# Patient Record
Sex: MALE | Race: WHITE | NOT HISPANIC OR LATINO
[De-identification: names, ages, dates, MRNs, and addresses within clinical notes are randomized per-mention and may not be internally consistent; named-entity substitution may affect disease eponyms.]

---

## 2018-03-09 ENCOUNTER — APPOINTMENT (OUTPATIENT)
Dept: PULMONOLOGY | Facility: CLINIC | Age: 58
End: 2018-03-09
Payer: COMMERCIAL

## 2018-03-09 VITALS
DIASTOLIC BLOOD PRESSURE: 78 MMHG | HEIGHT: 73 IN | HEART RATE: 77 BPM | SYSTOLIC BLOOD PRESSURE: 120 MMHG | OXYGEN SATURATION: 96 % | BODY MASS INDEX: 31.14 KG/M2 | WEIGHT: 235 LBS | RESPIRATION RATE: 17 BRPM

## 2018-03-09 PROCEDURE — 99214 OFFICE O/P EST MOD 30 MIN: CPT | Mod: 25

## 2018-03-09 PROCEDURE — 94010 BREATHING CAPACITY TEST: CPT

## 2018-03-09 PROCEDURE — 71046 X-RAY EXAM CHEST 2 VIEWS: CPT

## 2018-03-09 RX ORDER — MONTELUKAST 10 MG/1
10 TABLET, FILM COATED ORAL
Qty: 1 | Refills: 1 | Status: ACTIVE | COMMUNITY
Start: 2018-03-09 | End: 1900-01-01

## 2018-03-09 RX ORDER — ALBUTEROL SULFATE 90 UG/1
108 (90 BASE) AEROSOL, METERED RESPIRATORY (INHALATION) EVERY 6 HOURS
Qty: 3 | Refills: 1 | Status: ACTIVE | COMMUNITY
Start: 2018-03-09 | End: 1900-01-01

## 2018-03-09 RX ORDER — FLUTICASONE FUROATE, UMECLIDINIUM BROMIDE AND VILANTEROL TRIFENATATE 100; 62.5; 25 UG/1; UG/1; UG/1
100-62.5-25 POWDER RESPIRATORY (INHALATION)
Qty: 3 | Refills: 1 | Status: ACTIVE | COMMUNITY
Start: 2018-03-09 | End: 1900-01-01

## 2018-07-13 ENCOUNTER — NON-APPOINTMENT (OUTPATIENT)
Age: 58
End: 2018-07-13

## 2018-07-13 ENCOUNTER — APPOINTMENT (OUTPATIENT)
Dept: PULMONOLOGY | Facility: CLINIC | Age: 58
End: 2018-07-13
Payer: COMMERCIAL

## 2018-07-13 VITALS
OXYGEN SATURATION: 96 % | BODY MASS INDEX: 31.14 KG/M2 | HEIGHT: 73 IN | DIASTOLIC BLOOD PRESSURE: 80 MMHG | HEART RATE: 70 BPM | SYSTOLIC BLOOD PRESSURE: 126 MMHG | RESPIRATION RATE: 17 BRPM | WEIGHT: 235 LBS

## 2018-07-13 PROCEDURE — 94010 BREATHING CAPACITY TEST: CPT

## 2018-07-13 PROCEDURE — 99214 OFFICE O/P EST MOD 30 MIN: CPT | Mod: 25

## 2018-07-13 RX ORDER — UMECLIDINIUM 62.5 UG/1
62.5 AEROSOL, POWDER ORAL
Qty: 3 | Refills: 1 | Status: ACTIVE | COMMUNITY
Start: 2018-07-13 | End: 1900-01-01

## 2018-07-13 RX ORDER — ALBUTEROL SULFATE 90 UG/1
108 (90 BASE) AEROSOL, METERED RESPIRATORY (INHALATION) EVERY 6 HOURS
Qty: 3 | Refills: 1 | Status: ACTIVE | COMMUNITY
Start: 2018-07-13 | End: 1900-01-01

## 2018-11-16 ENCOUNTER — APPOINTMENT (OUTPATIENT)
Dept: PULMONOLOGY | Facility: CLINIC | Age: 58
End: 2018-11-16

## 2018-11-27 ENCOUNTER — EMERGENCY (EMERGENCY)
Facility: HOSPITAL | Age: 58
LOS: 1 days | Discharge: HOME | End: 2018-11-27
Attending: EMERGENCY MEDICINE
Payer: COMMERCIAL

## 2018-11-27 VITALS
OXYGEN SATURATION: 100 % | SYSTOLIC BLOOD PRESSURE: 144 MMHG | RESPIRATION RATE: 18 BRPM | DIASTOLIC BLOOD PRESSURE: 90 MMHG | TEMPERATURE: 98 F | HEART RATE: 88 BPM

## 2018-11-27 VITALS
WEIGHT: 229.94 LBS | HEART RATE: 79 BPM | RESPIRATION RATE: 16 BRPM | TEMPERATURE: 99 F | HEIGHT: 73 IN | DIASTOLIC BLOOD PRESSURE: 103 MMHG | OXYGEN SATURATION: 98 % | SYSTOLIC BLOOD PRESSURE: 154 MMHG

## 2018-11-27 DIAGNOSIS — Z98.890 OTHER SPECIFIED POSTPROCEDURAL STATES: Chronic | ICD-10-CM

## 2018-11-27 PROCEDURE — 73030 X-RAY EXAM OF SHOULDER: CPT | Mod: 26,RT

## 2018-11-27 PROCEDURE — 73030 X-RAY EXAM OF SHOULDER: CPT | Mod: 26,RT,77

## 2018-11-27 PROCEDURE — 99284 EMERGENCY DEPT VISIT MOD MDM: CPT

## 2018-11-27 PROCEDURE — 73060 X-RAY EXAM OF HUMERUS: CPT | Mod: 26,RT

## 2018-11-27 PROCEDURE — 73060 X-RAY EXAM OF HUMERUS: CPT

## 2018-11-27 PROCEDURE — 99283 EMERGENCY DEPT VISIT LOW MDM: CPT | Mod: 25

## 2018-11-27 PROCEDURE — 73020 X-RAY EXAM OF SHOULDER: CPT

## 2018-11-27 PROCEDURE — 73030 X-RAY EXAM OF SHOULDER: CPT

## 2018-11-27 RX ORDER — LIDOCAINE 4 G/100G
1 CREAM TOPICAL ONCE
Qty: 0 | Refills: 0 | Status: COMPLETED | OUTPATIENT
Start: 2018-11-27 | End: 2018-11-27

## 2018-11-27 RX ORDER — TRAMADOL HYDROCHLORIDE 50 MG/1
50 TABLET ORAL ONCE
Qty: 0 | Refills: 0 | Status: DISCONTINUED | OUTPATIENT
Start: 2018-11-27 | End: 2018-11-27

## 2018-11-27 RX ORDER — MORPHINE SULFATE 50 MG/1
15 CAPSULE, EXTENDED RELEASE ORAL ONCE
Qty: 0 | Refills: 0 | Status: COMPLETED | OUTPATIENT
Start: 2018-11-27 | End: 2018-11-27

## 2018-11-27 RX ORDER — IBUPROFEN 200 MG
600 TABLET ORAL ONCE
Qty: 0 | Refills: 0 | Status: COMPLETED | OUTPATIENT
Start: 2018-11-27 | End: 2018-11-27

## 2018-11-27 RX ORDER — MORPHINE SULFATE 50 MG/1
1 CAPSULE, EXTENDED RELEASE ORAL
Qty: 4 | Refills: 0 | OUTPATIENT
Start: 2018-11-27 | End: 2018-11-28

## 2018-11-27 RX ADMIN — LIDOCAINE 1 PATCH: 4 CREAM TOPICAL at 06:35

## 2018-11-27 RX ADMIN — LIDOCAINE 1 PATCH: 4 CREAM TOPICAL at 07:12

## 2018-11-27 RX ADMIN — TRAMADOL HYDROCHLORIDE 50 MILLIGRAM(S): 50 TABLET ORAL at 07:59

## 2018-11-27 RX ADMIN — Medication 600 MILLIGRAM(S): at 06:34

## 2018-11-27 RX ADMIN — Medication 600 MILLIGRAM(S): at 07:12

## 2018-11-27 RX ADMIN — TRAMADOL HYDROCHLORIDE 50 MILLIGRAM(S): 50 TABLET ORAL at 07:22

## 2018-11-27 NOTE — ED PROVIDER NOTE - NSFOLLOWUPINSTRUCTIONS_ED_ALL_ED_FT
1. You were seen for shoulder pain. A copy of your resulted labs, imaging, and findings have been provided to you.  2.  morphine from your pharmacy and take the medication as prescribed on the bottle's manufacterer's label, and consult a pharmacist or your primary care doctor with any questions. Do not drive while taking morphine.   3. Follow up with an orthopedic surgeon ([facility] call [phone] to make an appointment and your primary care doctor within 48 hours. Please call [phone] to make an appointment or with any questions you may have.  4. Return immediately to the emergency department for new, persistent, or worsening symptoms or signs. Return immediately to the emergency department if you have chest pain, shortness of breath, loss of consciousness, discoloration or tenseness or loss of movement or sensation in your arm.

## 2018-11-27 NOTE — ED PROVIDER NOTE - PROGRESS NOTE DETAILS
Salima BRADY: pt states he still has pain in his R arm, called radiology attg Tamanna BRADY who said there is no fx of R humerus will d/c with morphine rx and ortho and pmd f/u and return precautions Salima BRADY: pt states he still has pain in his R arm, on exam radial pulse 2+ BUE compartments soft skin warm and dry called radiology attg Tamanna BRADY who said there is no fx of R humerus will d/c with morphine rx and ortho and pmd f/u and return precautions

## 2018-11-27 NOTE — ED PROVIDER NOTE - MEDICAL DECISION MAKING DETAILS
58M, p.w right shoulder pain s/p  fall. No gross deformity/dislocation on physical exam, will get xray and lido patch w. pain control. No numbness/tingling, sensation intact, neurovascular intact. FROM is limited due to pain. Likely muscle strain. d/c if xray neg. 58M, p.w right shoulder pain s/p  fall. No gross deformity/dislocation on physical exam, will get xray and lido patch w. pain control. No numbness/tingling, sensation intact, neurovascular intact. FROM is limited due to pain. Likely muscle strain. d/c if xray neg.  Attending Angeline Garcia: 59 y/o male right hand dominant presenting after mechanical fall onto right shoulder. no obvious deformity on exam. no focal neurologic deficit. likely muscular contusion. will obtain xray, pain control and re-eval

## 2018-11-27 NOTE — ED PROVIDER NOTE - OBJECTIVE STATEMENT
58M p.w  fall after spilling on water last night and fell on right shoulder. Pt has sharp pain on the right shoulder and unable to extend his right arm due to pain but denied any numbness/tingling, loc, head injury, headache, lightheadedness, vertigo, chestpain, shortness of breath. 58M p.w  fall after spilling on water last night and fell on right shoulder. Pt has sharp pain on the right shoulder and unable to extend his right arm due to pain but denied any numbness/tingling, loc, head injury, headache, lightheadedness, vertigo, chestpain, shortness of breath.  Attending Angeline Garcia: 59 y/o male right hand dominant presenting with right shoulder pain. pt slipped and fell onto right shoulder. since then increased pain to right arm with difficulty with moving it. no numbness or tingling. not on blood thinners and no h/o bleeding disorders.did not hit his head  and denies any LOC. pain severe with movement.

## 2018-11-27 NOTE — ED PROVIDER NOTE - ATTENDING CONTRIBUTION TO CARE
Attending MD Angeline Garcia:  I personally have seen and examined this patient.  Resident note reviewed and agree on plan of care and except where noted.  See HPI, PE, and MDM for details.

## 2018-11-27 NOTE — ED PROVIDER NOTE - PHYSICAL EXAMINATION
General: NAD, good hygiene, well developed  MSK: tender around acromion-clavicular, no swelling/abrasion, gross deformity or fractures, sensation is intact b/l, FROM of right shoulder is limited due to pain, flexion and abduction to 70 degree w.o pain, unable to extend pass 90. FROM in R elbow and wrist. No tenderness or deformity in right arm. radial pulses equal and b/l   HENT: Atraumatic, PERRLA, no conjunctivae injection  Cardiovascular: RRR, S1&2, no M or R, radial pulses equal and b/l  Respiratory: CTABL, no wheezes or crackles, no decreased breath sounds  Neurologic: nonfocal, AAOx3  Psych: normal mood and affect General: NAD, good hygiene, well developed  MSK: tender around acromion-clavicular, no swelling/abrasion, gross deformity or fractures, sensation is intact b/l, FROM of right shoulder is limited due to pain, flexion and abduction to 70 degree w.o pain, unable to extend pass 90. FROM in R elbow and wrist. No tenderness or deformity in right arm. radial pulses equal and b/l   HENT: Atraumatic, PERRLA, no conjunctivae injection  Cardiovascular: RRR, S1&2, no M or R, radial pulses equal and b/l  Respiratory: CTABL, no wheezes or crackles, no decreased breath sounds  Neurologic: nonfocal, AAOx3  Psych: normal mood and affect  Attending Angeline Garcia: Gen: NAD, heent: atrauamtic, eomi, perrla, mmm, op pink, uvula midline, neck; nttp, no nuchal rigidity, chest: nttp, no crepitus, cv: rrr, no murmurs, lungs: ctab, abd: soft, nontender, nondistended, no peritoneal signs, +BS, no guarding, ext: ttp right shoulder and upper bicep. no deformity. no tenting, compartment soft, skin: no rash, neuro: awake and alert, following commands, speech clear, sensation and strength intact, no focal deficits

## 2018-11-27 NOTE — ED PROVIDER NOTE - CARE PLAN
Assessment and plan of treatment:	Thank you for visiting our Emergency Department, it has been a pleasure taking part in your healthcare.    Your discharge diagnosis is: right shoulder injury    A copy of resulted labs, imaging, and findings have been provided to you.   You have had a detailed discussion with your provider regarding your diagnosis, management and discharge plan. You have been given the opportunity to have your questions answered. At this time you have been deemed stable and fit for discharge.    Return precautions to the Emergency Department include but are not limited to: unrelenting nausea, vomiting, fever, chills, chest pain, shortness of breath, dizziness, abdominal pain, worsening pain, syncope, blood in urine or stool, headache that doesn't resolve, numbness or tingling, loss of sensation, loss of motor function, or any other concerning symptoms. Principal Discharge DX:	Shoulder injury, initial encounter  Assessment and plan of treatment:	Thank you for visiting our Emergency Department, it has been a pleasure taking part in your healthcare.    Your discharge diagnosis is: right shoulder injury    A copy of resulted labs, imaging, and findings have been provided to you.   You have had a detailed discussion with your provider regarding your diagnosis, management and discharge plan. You have been given the opportunity to have your questions answered. At this time you have been deemed stable and fit for discharge.    Return precautions to the Emergency Department include but are not limited to: unrelenting nausea, vomiting, fever, chills, chest pain, shortness of breath, dizziness, abdominal pain, worsening pain, syncope, blood in urine or stool, headache that doesn't resolve, numbness or tingling, loss of sensation, loss of motor function, or any other concerning symptoms.

## 2018-11-27 NOTE — ED ADULT NURSE NOTE - PMH
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

## 2018-11-27 NOTE — ED ADULT NURSE REASSESSMENT NOTE - NS ED NURSE REASSESS COMMENT FT1
Report obtained from JODY Hussein. Patients vital signs are stable, patient reports 10/10 pain upon movement but 0/10 at rest. PO tramadol administered will reassess pain in 30 minutes. Patient appears to be in no acute distress, family member at bedside, side rails up call bell within reach.

## 2018-11-27 NOTE — ED PROVIDER NOTE - NS ED ROS FT
07-Aug-2017 15:37 General: denies fever, chills, fatigue  HENT: denies nasal congestion, sore throat, ear pain, hearing loss  Eyes: denies visual changes  Neck: denies neck pain, swelling, stiffness  CV: denies chest pain, palpitations  Resp: denies difficulty breathing, cough  GI: denies nausea, vomiting, diarrhea, abdominal pain, constipation  Urinary: denies pain on urination change  MSK: HPI   Neuro: denies headaches, lightheadedness  Skin: denies rashes

## 2018-11-27 NOTE — ED ADULT NURSE NOTE - NSIMPLEMENTINTERV_GEN_ALL_ED
Implemented All Fall Risk Interventions:  [place] to call system. Call bell, personal items and telephone within reach. Instruct patient to call for assistance. Room bathroom lighting operational. Non-slip footwear when patient is off stretcher. Physically safe environment: no spills, clutter or unnecessary equipment. Stretcher in lowest position, wheels locked, appropriate side rails in place. Provide visual cue, wrist band, yellow gown, etc. Monitor gait and stability. Monitor for mental status changes and reorient to person, place, and time. Review medications for side effects contributing to fall risk. Reinforce activity limits and safety measures with patient and family.

## 2018-11-27 NOTE — ED PROVIDER NOTE - NSFOLLOWUPCLINICS_GEN_ALL_ED_FT
[facility]  Orthopedic Surgery  [address] [address]  Phone: [phone]  Fax:   Follow Up Time: 1-3 Days

## 2018-11-27 NOTE — ED PROVIDER NOTE - PLAN OF CARE
Thank you for visiting our Emergency Department, it has been a pleasure taking part in your healthcare.    Your discharge diagnosis is: right shoulder injury    A copy of resulted labs, imaging, and findings have been provided to you.   You have had a detailed discussion with your provider regarding your diagnosis, management and discharge plan. You have been given the opportunity to have your questions answered. At this time you have been deemed stable and fit for discharge.    Return precautions to the Emergency Department include but are not limited to: unrelenting nausea, vomiting, fever, chills, chest pain, shortness of breath, dizziness, abdominal pain, worsening pain, syncope, blood in urine or stool, headache that doesn't resolve, numbness or tingling, loss of sensation, loss of motor function, or any other concerning symptoms.

## 2018-11-27 NOTE — ED ADULT NURSE NOTE - OBJECTIVE STATEMENT
Patient presented to ED ambulatory w/wife c/o right shoulder pain s/p mechanical fall. Patient woke up at about 430 am because the fire alarm was ringing, there was a leak on the ceiling and water was on the floor, patient sipped on the wet floor and fell on his right arm. Patient c/o right shoulder /upper arm pain w/ movement, when at rest (not movement) no pain. No further complaints.

## 2019-01-02 ENCOUNTER — OUTPATIENT (OUTPATIENT)
Dept: OUTPATIENT SERVICES | Facility: HOSPITAL | Age: 59
LOS: 1 days | End: 2019-01-02
Payer: COMMERCIAL

## 2019-01-02 VITALS
DIASTOLIC BLOOD PRESSURE: 68 MMHG | SYSTOLIC BLOOD PRESSURE: 132 MMHG | HEART RATE: 83 BPM | TEMPERATURE: 98 F | RESPIRATION RATE: 16 BRPM | OXYGEN SATURATION: 97 % | WEIGHT: 229.94 LBS | HEIGHT: 72 IN

## 2019-01-02 DIAGNOSIS — J45.909 UNSPECIFIED ASTHMA, UNCOMPLICATED: ICD-10-CM

## 2019-01-02 DIAGNOSIS — M75.121 COMPLETE ROTATOR CUFF TEAR OR RUPTURE OF RIGHT SHOULDER, NOT SPECIFIED AS TRAUMATIC: ICD-10-CM

## 2019-01-02 DIAGNOSIS — Z98.890 OTHER SPECIFIED POSTPROCEDURAL STATES: Chronic | ICD-10-CM

## 2019-01-02 DIAGNOSIS — G47.33 OBSTRUCTIVE SLEEP APNEA (ADULT) (PEDIATRIC): ICD-10-CM

## 2019-01-02 DIAGNOSIS — M25.511 PAIN IN RIGHT SHOULDER: ICD-10-CM

## 2019-01-02 DIAGNOSIS — Z01.818 ENCOUNTER FOR OTHER PREPROCEDURAL EXAMINATION: ICD-10-CM

## 2019-01-02 LAB
BUN SERPL-MCNC: 16 MG/DL — SIGNIFICANT CHANGE UP (ref 7–23)
CALCIUM SERPL-MCNC: 10.2 MG/DL — SIGNIFICANT CHANGE UP (ref 8.4–10.5)
CHLORIDE SERPL-SCNC: 102 MMOL/L — SIGNIFICANT CHANGE UP (ref 98–107)
CO2 SERPL-SCNC: 27 MMOL/L — SIGNIFICANT CHANGE UP (ref 22–31)
CREAT SERPL-MCNC: 1.18 MG/DL — SIGNIFICANT CHANGE UP (ref 0.5–1.3)
GLUCOSE SERPL-MCNC: 128 MG/DL — HIGH (ref 70–99)
HCT VFR BLD CALC: 48.6 % — SIGNIFICANT CHANGE UP (ref 39–50)
HGB BLD-MCNC: 16.4 G/DL — SIGNIFICANT CHANGE UP (ref 13–17)
MCHC RBC-ENTMCNC: 29.5 PG — SIGNIFICANT CHANGE UP (ref 27–34)
MCHC RBC-ENTMCNC: 33.7 % — SIGNIFICANT CHANGE UP (ref 32–36)
MCV RBC AUTO: 87.4 FL — SIGNIFICANT CHANGE UP (ref 80–100)
NRBC # FLD: 0 — SIGNIFICANT CHANGE UP
PLATELET # BLD AUTO: 192 K/UL — SIGNIFICANT CHANGE UP (ref 150–400)
PMV BLD: 11.2 FL — SIGNIFICANT CHANGE UP (ref 7–13)
POTASSIUM SERPL-MCNC: 4.6 MMOL/L — SIGNIFICANT CHANGE UP (ref 3.5–5.3)
POTASSIUM SERPL-SCNC: 4.6 MMOL/L — SIGNIFICANT CHANGE UP (ref 3.5–5.3)
RBC # BLD: 5.56 M/UL — SIGNIFICANT CHANGE UP (ref 4.2–5.8)
RBC # FLD: 12.5 % — SIGNIFICANT CHANGE UP (ref 10.3–14.5)
SODIUM SERPL-SCNC: 140 MMOL/L — SIGNIFICANT CHANGE UP (ref 135–145)
WBC # BLD: 6.23 K/UL — SIGNIFICANT CHANGE UP (ref 3.8–10.5)
WBC # FLD AUTO: 6.23 K/UL — SIGNIFICANT CHANGE UP (ref 3.8–10.5)

## 2019-01-02 PROCEDURE — 93010 ELECTROCARDIOGRAM REPORT: CPT

## 2019-01-02 RX ORDER — SODIUM CHLORIDE 9 MG/ML
1000 INJECTION, SOLUTION INTRAVENOUS
Qty: 0 | Refills: 0 | Status: DISCONTINUED | OUTPATIENT
Start: 2019-01-02 | End: 2019-01-17

## 2019-01-02 RX ORDER — BUDESONIDE AND FORMOTEROL FUMARATE DIHYDRATE 160; 4.5 UG/1; UG/1
2 AEROSOL RESPIRATORY (INHALATION)
Qty: 0 | Refills: 0 | COMMUNITY

## 2019-01-02 NOTE — H&P PST ADULT - LYMPHATIC
posterior cervical R/anterior cervical R/posterior cervical L/supraclavicular R/anterior cervical L/supraclavicular L

## 2019-01-02 NOTE — H&P PST ADULT - PSH
History of lung biopsy  Right Thoracoscopy 2004  S/P bilateral foot surgery  11/16  ; Right Foot Plantar fascitis ; left foot bone spur History of lung biopsy  Right Thoracoscopy 2004  S/P bilateral foot surgery  11/16  ; Right Foot Plantar Fascitis ; left foot bone spur

## 2019-01-02 NOTE — H&P PST ADULT - HISTORY OF PRESENT ILLNESS
Pt is a 58 y.o. male ; pt sustained injury to right shoulder ; pt to ER @ [facility] ; xrays were done ; pt to surgeon ; pt now Pt is a 58 y.o. male ;12/18 ; pt sustained fall ; pt reports  right shoulder pain  ; pt to ER @ [facility]  ; xrays were done ; pt referred to surgeon ; pt now presents for Right Shoulder Arthroscopy, Rotator Cuff Repair

## 2019-01-02 NOTE — H&P PST ADULT - PROBLEM SELECTOR PLAN 1
Right Shoulder Arthroscopy Rotator Cuff Repair     Pre op instructions reviewed with pt ; including Pepcid and Hibiclens ; pt appears to have a good understanding of pre op instructions

## 2019-01-02 NOTE — H&P PST ADULT - FAMILY HISTORY
Father  Still living? Yes, Estimated age: Age Unknown  Family history of prostate cancer in father, Age at diagnosis: Age Unknown     Mother  Still living? Yes, Estimated age: Age Unknown  Family history of memory loss, Age at diagnosis: Age Unknown

## 2019-01-02 NOTE — H&P PST ADULT - PMH
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

## 2019-01-04 PROBLEM — K21.9 GASTRO-ESOPHAGEAL REFLUX DISEASE WITHOUT ESOPHAGITIS: Chronic | Status: ACTIVE | Noted: 2019-01-02

## 2019-01-08 ENCOUNTER — APPOINTMENT (OUTPATIENT)
Dept: PULMONOLOGY | Facility: CLINIC | Age: 59
End: 2019-01-08
Payer: COMMERCIAL

## 2019-01-08 ENCOUNTER — NON-APPOINTMENT (OUTPATIENT)
Age: 59
End: 2019-01-08

## 2019-01-08 VITALS
DIASTOLIC BLOOD PRESSURE: 88 MMHG | SYSTOLIC BLOOD PRESSURE: 138 MMHG | BODY MASS INDEX: 31.14 KG/M2 | HEART RATE: 85 BPM | WEIGHT: 235 LBS | HEIGHT: 73 IN | RESPIRATION RATE: 17 BRPM | OXYGEN SATURATION: 98 %

## 2019-01-08 PROCEDURE — 94010 BREATHING CAPACITY TEST: CPT

## 2019-01-08 PROCEDURE — 99214 OFFICE O/P EST MOD 30 MIN: CPT | Mod: 25

## 2019-01-08 PROCEDURE — 94618 PULMONARY STRESS TESTING: CPT

## 2019-01-08 RX ORDER — BUDESONIDE AND FORMOTEROL FUMARATE DIHYDRATE 160; 4.5 UG/1; UG/1
160-4.5 AEROSOL RESPIRATORY (INHALATION) TWICE DAILY
Qty: 3 | Refills: 1 | Status: ACTIVE | COMMUNITY
Start: 2019-01-08 | End: 1900-01-01

## 2019-01-08 RX ORDER — ACLIDINIUM BROMIDE 400 UG/1
400 POWDER, METERED RESPIRATORY (INHALATION) TWICE DAILY
Qty: 3 | Refills: 1 | Status: ACTIVE | COMMUNITY
Start: 2019-01-08 | End: 1900-01-01

## 2019-01-08 NOTE — ASSESSMENT
[FreeTextEntry1] : Mr. Keen has a history of asthma, allergy, GERD, and OSAS, presenting to the office for pulmonary pre-op clearance for shoulder surgery. He is currently stable from a pulmonary perspective. \par \par ************************************Pulmonary Pre-Op Clearance for Shoulder Surgery**********************************\par -at this point in time there are no absolute pulmonary contraindications to go forward with the planned procedure \par -at the time of surgery s/he should have optimal pain control, incentive spirometry, early ambulation, DVT and GI prophylaxis. \par \par His shortness of breath is multifactorial due to: \par -overweight/out of shape\par -poor breathing mechanics\par -asthma\par -?CAD\par \par problem 1: mild persistent asthma \par -continue Symbicort 160 at 2 inhalations BID\par - Continue Incruse 1 inhalation Qd\par -continue Singulair 10 mg before bed \par -Continue Ventolin as a rescue inhaler 2 inhalations pre-exercise\par -Asthma is  believed to be caused by inherited (genetic) and environmental factor, but its exact cause is unknown. Asthma may be triggered by allergens, lung infections, or irritants in the air. Asthma triggers are different for each person\par -Inhaler technique reviewed as well as oral hygiene techniques reviewed with patient. Avoidance of cold air, extremes of temperature, rescue inhaler should be used before exercise. Order of medication reviewed with patient. Recommended use of a cool mist humidifier in the bedroom. Instructed to gargle and spit after inhaler use. \par \par problem 2: allergies/sinus\par -continue to use Astelin 0.15 1 sniff each nostril BID\par - Continue Clarinex 5 mg QHS\par -Environmental measures for allergies were encouraged including mattress and pillow cover, air purifier, and environmental controls. \par \par problem 3: GERD\par -add Zantac 300 mg QHS\par -recommended to increase hydration\par -Things to avoid including overeating, spicy foods, tight clothing, eating within three hours of bed, this list is not all inclusive. \par -For treatment of reflux, possible options discussed including diet control, H2 blockers, PPIs, as well as coating motility agents discussed as treatment options. Timing of meals and proximity of last meal to sleep were discussed. If symptoms persist, a formal gastrointestinal evaluation is needed.\par -Rule of 2's: Avoid eating too late, too fast, too much, too spicy or within two hours of bedtime \par \par problem 4: moderate sleep apnea\par -his sleep study showed an AH of 24.4\par -being recommended to see Bambi Fan for an oral appliance (NONCOMPLIANT)\par -Sleep apnea is associated with adverse clinical consequences which an affect most organ systems.  Cardiovascular disease risk includes arrhythmias, atrial fibrillation, hypertension, coronary artery disease, and stroke. Metabolic disorders include diabetes type 2, non-alcoholic fatty liver disease. Mood disorder especially depression; and cognitive decline especially in the elderly. Associations with  chronic reflux/Meyers’s esophagus some but not all inclusive. \par -Reasons  include arousal consistent with hypopnea; respiratory events most prominent in REM sleep or supine position; therefore sleep staging and body position are important for accurate diagnosis and estimation of AHI. \par \par problem 5: overweight/out of shape\par -Weight loss, exercise, and diet control were discussed and are highly encouraged. Treatment options were \par given such as, aqua therapy, and contacting a nutritionist.\par \par problem 6: poor breathing mechanics\par -Proper breathing techniques were reviewed with an emphasis of exhalation. Patient instructed to breath in for 1 second and out for four seconds. Patient was encouraged to not talk while walking.\par \par problem 7: ?CAD\par -recommended to follow up with a cardiologist\par \par \par F/U in 4 months\par He is encouraged to call with any changes, concerns, or questions

## 2019-01-08 NOTE — ADDENDUM
[FreeTextEntry1] : Documented by González Bennett acting as a scribe for Dr. Garrett Wood on 1/8/2019\par \par All medical record entries made by the Scribe were at my, Dr. Garrett Wood's, direction and personally dictated by me on 1/8/2019. I have reviewed the chart and agree that the record accurately reflects my personal performance of the history, physical exam, assessment and plan. I have also personally directed, reviewed, and agree with the discharge instructions. \par \par \par \par \par

## 2019-01-08 NOTE — PROCEDURE
[FreeTextEntry1] : Refused Niox\par \par PFT- spi reveals normal flows; FEV1 was 3.43L which is 83% of predicted; normal flow volume loop \par \par Patient completed a 6-minute walk/exercise study in which the Lowest Pulse Ox was 96%; there was evidence of severe dyspnea and very severe fatigue. The patient walked  34 laps or 554.2 meters.

## 2019-01-08 NOTE — REASON FOR VISIT
[Spouse] : spouse [Preoperative Evaluation] : an preoperative evaluation [FreeTextEntry1] : Shoulder surgery

## 2019-01-13 ENCOUNTER — TRANSCRIPTION ENCOUNTER (OUTPATIENT)
Age: 59
End: 2019-01-13

## 2019-01-13 NOTE — ASU DISCHARGE PLAN (ADULT/PEDIATRIC). - NOTIFY
Fever greater than 101/Numbness, color, or temperature change to extremity/Unable to Urinate/Swelling that continues/Bleeding that does not stop/Persistent Nausea and Vomiting/Pain not relieved by Medications

## 2019-01-13 NOTE — ASU DISCHARGE PLAN (ADULT/PEDIATRIC). - BATHING
shower only shower only/May remove dressing on Wednesday evening and shower. Wash incision with soap and water. Apply band aids.

## 2019-01-13 NOTE — ASU DISCHARGE PLAN (ADULT/PEDIATRIC). - MEDICATION SUMMARY - MEDICATIONS TO TAKE
I will START or STAY ON the medications listed below when I get home from the hospital:    Ventolin  -- 1-2 puffs prn  -- Indication: For Home med    Percocet 5/325 oral tablet  -- 1-2 tab(s) by mouth every 4-6 hours, As Needed -for severe pain MDD:8  -- Caution federal law prohibits the transfer of this drug to any person other  than the person for whom it was prescribed.  May cause drowsiness.  Alcohol may intensify this effect.  Use care when operating dangerous machinery.  This prescription cannot be refilled.  This product contains acetaminophen.  Do not use  with any other product containing acetaminophen to prevent possible liver damage.  Using more of this medication than prescribed may cause serious breathing problems.    -- Indication: For Pain    Symbicort 160 mcg-4.5 mcg/inh inhalation aerosol  -- 2 puff(s) inhaled 1-2 x  a day  -- Indication: For Home med

## 2019-01-13 NOTE — ASU DISCHARGE PLAN (ADULT/PEDIATRIC). - SPECIAL INSTRUCTIONS
see printed instructions see printed instructions    Keep arm in sling except when performing exercises.

## 2019-01-14 ENCOUNTER — OUTPATIENT (OUTPATIENT)
Dept: OUTPATIENT SERVICES | Facility: HOSPITAL | Age: 59
LOS: 1 days | Discharge: HOME | End: 2019-01-14

## 2019-01-14 VITALS
RESPIRATION RATE: 14 BRPM | DIASTOLIC BLOOD PRESSURE: 80 MMHG | OXYGEN SATURATION: 99 % | TEMPERATURE: 98 F | SYSTOLIC BLOOD PRESSURE: 148 MMHG | HEART RATE: 75 BPM

## 2019-01-14 VITALS
HEART RATE: 63 BPM | TEMPERATURE: 97 F | OXYGEN SATURATION: 99 % | DIASTOLIC BLOOD PRESSURE: 89 MMHG | HEIGHT: 72 IN | SYSTOLIC BLOOD PRESSURE: 140 MMHG | WEIGHT: 229.94 LBS | RESPIRATION RATE: 16 BRPM

## 2019-01-14 DIAGNOSIS — Z98.890 OTHER SPECIFIED POSTPROCEDURAL STATES: Chronic | ICD-10-CM

## 2019-01-14 DIAGNOSIS — M75.121 COMPLETE ROTATOR CUFF TEAR OR RUPTURE OF RIGHT SHOULDER, NOT SPECIFIED AS TRAUMATIC: ICD-10-CM

## 2019-01-14 NOTE — BRIEF OPERATIVE NOTE - POST-OP DX
Rotator cuff tear, right  01/14/2019    Active  Avel Ceballos  Subacromial bursitis of right shoulder joint  01/14/2019    Active  Avel Ceballos

## 2019-01-14 NOTE — BRIEF OPERATIVE NOTE - PROCEDURE
<<-----Click on this checkbox to enter Procedure Subacromial decompression  01/14/2019    Active  STUART  Rotator cuff repair  01/14/2019    Active  STUART

## 2019-04-09 ENCOUNTER — NON-APPOINTMENT (OUTPATIENT)
Age: 59
End: 2019-04-09

## 2019-04-09 ENCOUNTER — APPOINTMENT (OUTPATIENT)
Dept: PULMONOLOGY | Facility: CLINIC | Age: 59
End: 2019-04-09
Payer: COMMERCIAL

## 2019-04-09 VITALS
BODY MASS INDEX: 32.91 KG/M2 | OXYGEN SATURATION: 96 % | HEIGHT: 72 IN | SYSTOLIC BLOOD PRESSURE: 130 MMHG | RESPIRATION RATE: 16 BRPM | DIASTOLIC BLOOD PRESSURE: 80 MMHG | HEART RATE: 96 BPM | WEIGHT: 243 LBS

## 2019-04-09 DIAGNOSIS — Z57.9 OCCUPATIONAL EXPOSURE TO UNSPECIFIED RISK FACTOR: ICD-10-CM

## 2019-04-09 PROCEDURE — 99214 OFFICE O/P EST MOD 30 MIN: CPT | Mod: 25

## 2019-04-09 PROCEDURE — 94010 BREATHING CAPACITY TEST: CPT

## 2019-04-09 SDOH — HEALTH STABILITY - PHYSICAL HEALTH: OCCUPATIONAL EXPOSURE TO UNSPECIFIED RISK FACTOR: Z57.9

## 2019-04-09 NOTE — ADDENDUM
[FreeTextEntry1] : Documented by González Bennett acting as a scribe for Dr. Garrett Wood on 4/9/2019\par \par All medical record entries made by the Scribe were at my, Dr. Garrett Wood's, direction and personally dictated by me on 4/9/2019. I have reviewed the chart and agree that the record accurately reflects my personal performance of the history, physical exam, assessment and plan. I have also personally directed, reviewed, and agree with the discharge instructions. \par \par \par \par \par

## 2019-04-09 NOTE — PROCEDURE
[FreeTextEntry1] : PFT- spi reveals mild restrictive dysfunction; FEV1 was 2.99L which is 75% of predicted; normal flow volume loop

## 2019-04-09 NOTE — ASSESSMENT
[FreeTextEntry1] : Mr. Keen has a history of asthma, allergy, GERD, OSAS, and extensive occupational exposure in the workplace (s/p 9/11), presenting to the office for a follow up visit s/p shoulder surgery (Dr. Sosa). He is symptomatic of OSAS and allergies. \par \par His shortness of breath is multifactorial due to: \par -overweight/out of shape\par -poor breathing mechanics\par -asthma\par -?CAD\par \par problem 1: mild persistent asthma \par -continue Symbicort 160 at 2 inhalations BID\par - Continue Incruse 1 inhalation Qd\par -continue Singulair 10 mg before bed \par -Continue Ventolin as a rescue inhaler 2 inhalations pre-exercise\par -Asthma is  believed to be caused by inherited (genetic) and environmental factor, but its exact cause is unknown. Asthma may be triggered by allergens, lung infections, or irritants in the air. Asthma triggers are different for each person\par -Inhaler technique reviewed as well as oral hygiene techniques reviewed with patient. Avoidance of cold air, extremes of temperature, rescue inhaler should be used before exercise. Order of medication reviewed with patient. Recommended use of a cool mist humidifier in the bedroom. Instructed to gargle and spit after inhaler use. \par \par problem 2: allergies/sinus\par -continue to use Astelin 0.15 1 sniff each nostril BID\par - Continue Clarinex 5 mg QHS\par -Environmental measures for allergies were encouraged including mattress and pillow cover, air purifier, and environmental controls. \par \par problem 3: GERD\par - Continue Zantac 300 mg QHS\par -recommended to increase hydration\par -Things to avoid including overeating, spicy foods, tight clothing, eating within three hours of bed, this list is not all inclusive. \par -For treatment of reflux, possible options discussed including diet control, H2 blockers, PPIs, as well as coating motility agents discussed as treatment options. Timing of meals and proximity of last meal to sleep were discussed. If symptoms persist, a formal gastrointestinal evaluation is needed.\par -Rule of 2's: Avoid eating too late, too fast, too much, too spicy or within two hours of bedtime \par \par problem 4: moderate sleep apnea\par -his sleep study showed an AH of 24.4\par - Recommended Oxy Aid\par -being recommended to see Bambi Fan for an oral appliance (NONCOMPLIANT)\par -Sleep apnea is associated with adverse clinical consequences which an affect most organ systems.  Cardiovascular disease risk includes arrhythmias, atrial fibrillation, hypertension, coronary artery disease, and stroke. Metabolic disorders include diabetes type 2, non-alcoholic fatty liver disease. Mood disorder especially depression; and cognitive decline especially in the elderly. Associations with  chronic reflux/Meyers’s esophagus some but not all inclusive. \par -Reasons  include arousal consistent with hypopnea; respiratory events most prominent in REM sleep or supine position; therefore sleep staging and body position are important for accurate diagnosis and estimation of AHI. \par \par problem 5: overweight/out of shape\par -Weight loss, exercise, and diet control were discussed and are highly encouraged. Treatment options were \par given such as, aqua therapy, and contacting a nutritionist.\par \par problem 6: poor breathing mechanics\par -Proper breathing techniques were reviewed with an emphasis of exhalation. Patient instructed to breath in for 1 second and out for four seconds. Patient was encouraged to not talk while walking.\par \par problem 7: ?CAD\par -recommended to follow up with a cardiologist\par \par Problem 8: Abnormal CT \par - Script given for follow up CT scan of the chest 4/2019\par \par Problem 9: Health maintenance \par -s/p flu shot 2018\par -recommended strep pneumonia vaccines: Prevnar-13 vaccine, followed by Pneumo vaccine 23 one year following\par -recommended early intervention for URIs\par -recommended regular osteoporosis evaluations\par -recommended early dermatological evaluations\par -recommended after the age of 50 to the age of 70, colonoscopy every 5 years\par \par F/U in 4 months\par He is encouraged to call with any changes, concerns, or questions

## 2019-04-09 NOTE — REASON FOR VISIT
[Follow-Up] : a follow-up visit [Spouse] : spouse [FreeTextEntry1] : Abnormal CT, acid reflux disease, asthma, SUZY

## 2019-04-09 NOTE — PHYSICAL EXAM
[General Appearance - Well Developed] : well developed [Normal Appearance] : normal appearance [Well Groomed] : well groomed [General Appearance - In No Acute Distress] : no acute distress [No Deformities] : no deformities [General Appearance - Well Nourished] : well nourished [Normal Conjunctiva] : the conjunctiva exhibited no abnormalities [Eyelids - No Xanthelasma] : the eyelids demonstrated no xanthelasmas [Normal Oropharynx] : normal oropharynx [Neck Cervical Mass (___cm)] : no neck mass was observed [Jugular Venous Distention Increased] : there was no jugular-venous distention [Neck Appearance] : the appearance of the neck was normal [Heart Rate And Rhythm] : heart rate and rhythm were normal [Thyroid Diffuse Enlargement] : the thyroid was not enlarged [Thyroid Nodule] : there were no palpable thyroid nodules [Murmurs] : no murmurs present [Heart Sounds] : normal S1 and S2 [Respiration, Rhythm And Depth] : normal respiratory rhythm and effort [Exaggerated Use Of Accessory Muscles For Inspiration] : no accessory muscle use [Auscultation Breath Sounds / Voice Sounds] : lungs were clear to auscultation bilaterally [Abdomen Soft] : soft [Abdomen Tenderness] : non-tender [Abdomen Mass (___ Cm)] : no abdominal mass palpated [Abnormal Walk] : normal gait [Gait - Sufficient For Exercise Testing] : the gait was sufficient for exercise testing [Petechial Hemorrhages (___cm)] : no petechial hemorrhages [Nail Clubbing] : no clubbing of the fingernails [Cyanosis, Localized] : no localized cyanosis [Skin Color & Pigmentation] : normal skin color and pigmentation [] : no rash [No Venous Stasis] : no venous stasis [Skin Lesions] : no skin lesions [No Skin Ulcers] : no skin ulcer [Deep Tendon Reflexes (DTR)] : deep tendon reflexes were 2+ and symmetric [No Xanthoma] : no  xanthoma was observed [No Focal Deficits] : no focal deficits [Oriented To Time, Place, And Person] : oriented to person, place, and time [Sensation] : the sensory exam was normal to light touch and pinprick [Affect] : the affect was normal [Impaired Insight] : insight and judgment were intact [III] : III [FreeTextEntry1] : I:E 1:3, clear

## 2019-04-09 NOTE — HISTORY OF PRESENT ILLNESS
[FreeTextEntry1] : Mr. Keen is a 58 year old male presenting to the office for a follow up visit for abnormal CT, acid reflux disease, asthma, SUZY. His chief complaint is apneic episodes. \par - He comes in following his shoulder surgery by Dr. Sosa. He is currently in PT. \par - For the past few months, he has been waking up in the middle of the night (1-2x) gasping for air. He notes that it occurred before the surgery, but has worsened since his surgery. It takes 1-2 minutes for him to catch his breath and fall back asleep\par - He is waking up more fatigued than he feels he should\par - He is taking naps during the day \par - He is snoring. \par - He is not wheezing\par - He reports a nasal drip and is blowing his nose around 10x/day.\par - He states that he is beginning to develop a cold  \par - He denies any headaches, nausea, vomiting, fever, chills, sweats, chest pain, chest pressure, palpitations, coughing, wheezing, diarrhea, constipation, dysphagia, dizziness, leg swelling, leg pain, itchy eyes, itchy ears, heartburn, reflux, or sour taste in the mouth.

## 2019-06-19 ENCOUNTER — FORM ENCOUNTER (OUTPATIENT)
Age: 59
End: 2019-06-19

## 2019-06-20 ENCOUNTER — APPOINTMENT (OUTPATIENT)
Dept: CT IMAGING | Facility: CLINIC | Age: 59
End: 2019-06-20
Payer: COMMERCIAL

## 2019-06-20 ENCOUNTER — OUTPATIENT (OUTPATIENT)
Dept: OUTPATIENT SERVICES | Facility: HOSPITAL | Age: 59
LOS: 1 days | End: 2019-06-20
Payer: COMMERCIAL

## 2019-06-20 DIAGNOSIS — Z98.890 OTHER SPECIFIED POSTPROCEDURAL STATES: Chronic | ICD-10-CM

## 2019-06-20 DIAGNOSIS — Z00.8 ENCOUNTER FOR OTHER GENERAL EXAMINATION: ICD-10-CM

## 2019-06-20 PROCEDURE — 71250 CT THORAX DX C-: CPT

## 2019-06-20 PROCEDURE — 71250 CT THORAX DX C-: CPT | Mod: 26

## 2019-08-13 ENCOUNTER — NON-APPOINTMENT (OUTPATIENT)
Age: 59
End: 2019-08-13

## 2019-08-13 ENCOUNTER — APPOINTMENT (OUTPATIENT)
Dept: PULMONOLOGY | Facility: CLINIC | Age: 59
End: 2019-08-13
Payer: COMMERCIAL

## 2019-08-13 VITALS
DIASTOLIC BLOOD PRESSURE: 80 MMHG | OXYGEN SATURATION: 97 % | BODY MASS INDEX: 31.15 KG/M2 | WEIGHT: 230 LBS | SYSTOLIC BLOOD PRESSURE: 124 MMHG | HEART RATE: 76 BPM | HEIGHT: 72 IN | RESPIRATION RATE: 16 BRPM

## 2019-08-13 PROCEDURE — 99214 OFFICE O/P EST MOD 30 MIN: CPT | Mod: 25

## 2019-08-13 PROCEDURE — 94010 BREATHING CAPACITY TEST: CPT

## 2019-08-13 NOTE — PHYSICAL EXAM
[Normal Appearance] : normal appearance [General Appearance - Well Developed] : well developed [Well Groomed] : well groomed [No Deformities] : no deformities [General Appearance - Well Nourished] : well nourished [Normal Conjunctiva] : the conjunctiva exhibited no abnormalities [General Appearance - In No Acute Distress] : no acute distress [Eyelids - No Xanthelasma] : the eyelids demonstrated no xanthelasmas [Normal Oropharynx] : normal oropharynx [III] : III [Neck Appearance] : the appearance of the neck was normal [Neck Cervical Mass (___cm)] : no neck mass was observed [Jugular Venous Distention Increased] : there was no jugular-venous distention [Thyroid Diffuse Enlargement] : the thyroid was not enlarged [Heart Rate And Rhythm] : heart rate and rhythm were normal [Thyroid Nodule] : there were no palpable thyroid nodules [Murmurs] : no murmurs present [Heart Sounds] : normal S1 and S2 [Respiration, Rhythm And Depth] : normal respiratory rhythm and effort [Auscultation Breath Sounds / Voice Sounds] : lungs were clear to auscultation bilaterally [Exaggerated Use Of Accessory Muscles For Inspiration] : no accessory muscle use [Abdomen Soft] : soft [Abdomen Tenderness] : non-tender [Abdomen Mass (___ Cm)] : no abdominal mass palpated [Abnormal Walk] : normal gait [Gait - Sufficient For Exercise Testing] : the gait was sufficient for exercise testing [Nail Clubbing] : no clubbing of the fingernails [Petechial Hemorrhages (___cm)] : no petechial hemorrhages [Cyanosis, Localized] : no localized cyanosis [Skin Color & Pigmentation] : normal skin color and pigmentation [] : no rash [No Venous Stasis] : no venous stasis [Skin Lesions] : no skin lesions [No Skin Ulcers] : no skin ulcer [No Xanthoma] : no  xanthoma was observed [Sensation] : the sensory exam was normal to light touch and pinprick [Deep Tendon Reflexes (DTR)] : deep tendon reflexes were 2+ and symmetric [No Focal Deficits] : no focal deficits [Oriented To Time, Place, And Person] : oriented to person, place, and time [Impaired Insight] : insight and judgment were intact [Affect] : the affect was normal [FreeTextEntry1] : I:E 1:3, clear

## 2019-08-13 NOTE — REVIEW OF SYSTEMS
[Negative] : Pulmonary Hypertension [Postnasal Drip] : postnasal drip [Cough] : cough [Dyspnea] : dyspnea [As Noted in HPI] : as noted in HPI [Snoring] : snoring

## 2019-08-13 NOTE — HISTORY OF PRESENT ILLNESS
[FreeTextEntry1] : Mr. Keen is a 58 year old male presenting to the office for a follow up visit for abnormal CT, acid reflux disease, asthma, SUZY. His chief complaint is poison ivy rash.\par -he reports having a poison ivy rash onset 2 weeks ago all over his chest and abdomen\par -he reports feeling generally well\par -he notes he isn't sleeping well and his wife adds he is snoring\par -he reports getting reflux occasionally, depending on what he eats\par -he reports he is coughing dryly due to the heat and humidity.  He reports he was using his inhalers, but still has SOB\par -he reports having a post nasal drip still from his allergies\par -he notes his breathing has improved \par -he reports he hasn’t been exercising regularly\par -he denies any chest pain, chest pressure, diarrhea, constipation, dysphagia, dizziness, sour taste in the mouth, itchy eyes, itchy ears

## 2019-08-13 NOTE — PROCEDURE
[FreeTextEntry1] : PFT revealed normal flows, with a FEV1 of 3.59L, which is 94% of predicted, with a normal flow volume loop\par \par Patient refused to complete Feno/NiOx due to his insurance not covering the test\par \par Chest CT (6.20.19) reveals right lower lobe linear hyperdensity may be related to prior surgical resection. Lungs otherwise clear.

## 2019-08-13 NOTE — ADDENDUM
[FreeTextEntry1] : Documented by Keith Little acting as a scribe for Dr. Garrett Wood on 08/13/2019.\par \par All medical record entries made by the Scribe were at my, Dr. Garrett Wood's, direction and personally dictated by me on 08/13/2019. I have reviewed the chart and agree that the record accurately reflects my personal performance of the history, physical exam, assessment and plan. I have also personally directed, reviewed, and agree with the discharge instructions. \par \par \par

## 2019-08-13 NOTE — ASSESSMENT
[FreeTextEntry1] : Mr. Keen has a history of asthma, allergy, GERD, OSAS, and extensive occupational exposure in the workplace (s/p 9/11), presenting to the office for a follow up visit s/p shoulder surgery (Dr. Sosa). He is symptomatic of OSAS and breathing\par \par His shortness of breath is multifactorial due to: \par -overweight/out of shape\par -poor breathing mechanics\par -asthma\par -?CAD\par \par problem 1: mild persistent asthma \par -continue Symbicort 160 at 2 inhalations BID\par - Continue Incruse 1 inhalation Qd\par -continue Singulair 10 mg before bed \par -Continue Ventolin as a rescue inhaler 2 inhalations pre-exercise\par -Asthma is  believed to be caused by inherited (genetic) and environmental factor, but its exact cause is unknown. Asthma may be triggered by allergens, lung infections, or irritants in the air. Asthma triggers are different for each person\par -Inhaler technique reviewed as well as oral hygiene techniques reviewed with patient. Avoidance of cold air, extremes of temperature, rescue inhaler should be used before exercise. Order of medication reviewed with patient. Recommended use of a cool mist humidifier in the bedroom. Instructed to gargle and spit after inhaler use. \par \par problem 2: allergies/sinus\par -continue to use Astelin 0.15 1 sniff each nostril BID\par - Continue Clarinex 5 mg QHS\par -Environmental measures for allergies were encouraged including mattress and pillow cover, air purifier, and environmental controls. \par \par problem 3: GERD\par - Continue Zantac 300 mg QHS\par -recommended to increase hydration\par -Things to avoid including overeating, spicy foods, tight clothing, eating within three hours of bed, this list is not all inclusive. \par -For treatment of reflux, possible options discussed including diet control, H2 blockers, PPIs, as well as coating motility agents discussed as treatment options. Timing of meals and proximity of last meal to sleep were discussed. If symptoms persist, a formal gastrointestinal evaluation is needed.\par -Rule of 2's: Avoid eating too late, too fast, too much, too spicy or within two hours of bedtime \par \par problem 4: moderate sleep apnea\par -his sleep study showed an AH of 24.4\par - Recommended to use Oxy Aid or the chin strap\par -being recommended to see Bambi Fan for an oral appliance (NONCOMPLIANT)\par -Sleep apnea is associated with adverse clinical consequences which an affect most organ systems.  Cardiovascular disease risk includes arrhythmias, atrial fibrillation, hypertension, coronary artery disease, and stroke. Metabolic disorders include diabetes type 2, non-alcoholic fatty liver disease. Mood disorder especially depression; and cognitive decline especially in the elderly. Associations with  chronic reflux/Meyers’s esophagus some but not all inclusive. \par -Reasons  include arousal consistent with hypopnea; respiratory events most prominent in REM sleep or supine position; therefore sleep staging and body position are important for accurate diagnosis and estimation of AHI. \par \par problem 5: overweight/out of shape\par -Weight loss, exercise, and diet control were discussed and are highly encouraged. Treatment options were \par given such as, aqua therapy, and contacting a nutritionist.\par \par problem 6: poor breathing mechanics\par -Proper breathing techniques were reviewed with an emphasis of exhalation. Patient instructed to breath in for 1 second and out for four seconds. Patient was encouraged to not talk while walking.\par \par problem 7: ?CAD\par -recommended to follow up with a cardiologist\par \par Problem 8: Abnormal CT \par - Script given for follow up CT scan of the chest 6/2020\par \par Problem 9: Poison Ivy Rash\par -Add a short course of Prednisone; 20 mg for 7 days, then 10 mg for 7 days\par Information sheet given to the patient to be reviewed, this medication is never to be used without consulting the prescribing physician. Proper dietary restraint is necessary specifically salt containing foods, if any reaction may occur should be reported. \par \par Problem 10: Health maintenance \par -s/p flu shot 2018\par -recommended strep pneumonia vaccines: Prevnar-13 vaccine, followed by Pneumo vaccine 23 one year following\par -recommended early intervention for URIs\par -recommended regular osteoporosis evaluations\par -recommended early dermatological evaluations\par -recommended after the age of 50 to the age of 70, colonoscopy every 5 years\par \par F/U in 4 months with SPI and NiOx\par He is encouraged to call with any changes, concerns, or questions

## 2020-03-03 ENCOUNTER — APPOINTMENT (OUTPATIENT)
Dept: PULMONOLOGY | Facility: CLINIC | Age: 60
End: 2020-03-03
Payer: COMMERCIAL

## 2020-03-03 ENCOUNTER — NON-APPOINTMENT (OUTPATIENT)
Age: 60
End: 2020-03-03

## 2020-03-03 VITALS
DIASTOLIC BLOOD PRESSURE: 80 MMHG | HEART RATE: 98 BPM | BODY MASS INDEX: 30.8 KG/M2 | SYSTOLIC BLOOD PRESSURE: 110 MMHG | OXYGEN SATURATION: 98 % | RESPIRATION RATE: 17 BRPM | HEIGHT: 74 IN | WEIGHT: 240 LBS

## 2020-03-03 PROCEDURE — 94010 BREATHING CAPACITY TEST: CPT

## 2020-03-03 PROCEDURE — 99214 OFFICE O/P EST MOD 30 MIN: CPT | Mod: 25

## 2020-03-03 RX ORDER — PREDNISONE 10 MG/1
10 TABLET ORAL
Qty: 50 | Refills: 0 | Status: DISCONTINUED | COMMUNITY
Start: 2019-08-13 | End: 2020-03-03

## 2020-03-03 RX ORDER — RANITIDINE HYDROCHLORIDE 300 MG/1
300 CAPSULE ORAL
Qty: 1 | Refills: 1 | Status: DISCONTINUED | COMMUNITY
Start: 2018-03-09 | End: 2020-03-03

## 2020-03-03 RX ORDER — RANITIDINE 300 MG/1
300 TABLET ORAL
Qty: 90 | Refills: 3 | Status: DISCONTINUED | COMMUNITY
Start: 2019-01-08 | End: 2020-03-03

## 2020-03-03 NOTE — ADDENDUM
[FreeTextEntry1] : Documented by Rowena Moscoso acting as a scribe for Dr. Garrett Wood on 03/03/2020.\par \par All medical record entries made by the Scribe were at my, Dr. Garrett Wood's, direction and personally dictated by me on 03/03/2020. I have reviewed the chart and agree that the record accurately reflects my personal performance of the history, physical exam, assessment and plan. I have also personally directed, reviewed, and agree with the discharge instructions.\par \par \par \par

## 2020-03-03 NOTE — HISTORY OF PRESENT ILLNESS
[FreeTextEntry1] : Mr. Keen is a 59 year old male presenting to the office for a follow up visit for abnormal CT, acid reflux disease, asthma, SUZY. His chief complaint is\par - right now he has been feeling decent physically although he has some complains of his shoulders and foot\par - he notes he has heartburn once or twice a week \par - he notes hes sleeping pretty well although he has been noncompliant with taking care of his sleep apnea. \par - he notes he gets SOB when he exerts himself \par -he denies any headaches, nausea, vomiting, fever, chills, sweats, chest pain, chest pressure, diarrhea, constipation, dysphagia, dizziness, sour taste in the mouth, leg swelling, leg pain, itchy eyes, itchy ears\par

## 2020-03-03 NOTE — PHYSICAL EXAM
[Normal Appearance] : normal appearance [General Appearance - Well Developed] : well developed [General Appearance - Well Nourished] : well nourished [Well Groomed] : well groomed [No Deformities] : no deformities [Normal Conjunctiva] : the conjunctiva exhibited no abnormalities [Eyelids - No Xanthelasma] : the eyelids demonstrated no xanthelasmas [General Appearance - In No Acute Distress] : no acute distress [III] : III [Neck Appearance] : the appearance of the neck was normal [Normal Oropharynx] : normal oropharynx [Jugular Venous Distention Increased] : there was no jugular-venous distention [Thyroid Diffuse Enlargement] : the thyroid was not enlarged [Neck Cervical Mass (___cm)] : no neck mass was observed [Heart Rate And Rhythm] : heart rate and rhythm were normal [Thyroid Nodule] : there were no palpable thyroid nodules [Heart Sounds] : normal S1 and S2 [Murmurs] : no murmurs present [Exaggerated Use Of Accessory Muscles For Inspiration] : no accessory muscle use [Respiration, Rhythm And Depth] : normal respiratory rhythm and effort [Abdomen Soft] : soft [Auscultation Breath Sounds / Voice Sounds] : lungs were clear to auscultation bilaterally [Abdomen Mass (___ Cm)] : no abdominal mass palpated [Abdomen Tenderness] : non-tender [Nail Clubbing] : no clubbing of the fingernails [Cyanosis, Localized] : no localized cyanosis [Gait - Sufficient For Exercise Testing] : the gait was sufficient for exercise testing [Abnormal Walk] : normal gait [Petechial Hemorrhages (___cm)] : no petechial hemorrhages [No Venous Stasis] : no venous stasis [] : no rash [Skin Color & Pigmentation] : normal skin color and pigmentation [Skin Lesions] : no skin lesions [No Xanthoma] : no  xanthoma was observed [No Skin Ulcers] : no skin ulcer [Sensation] : the sensory exam was normal to light touch and pinprick [Deep Tendon Reflexes (DTR)] : deep tendon reflexes were 2+ and symmetric [No Focal Deficits] : no focal deficits [Impaired Insight] : insight and judgment were intact [Oriented To Time, Place, And Person] : oriented to person, place, and time [Affect] : the affect was normal [FreeTextEntry1] : I:E 1:3, clear

## 2020-03-03 NOTE — ASSESSMENT
[FreeTextEntry1] : Mr. Keen has a history of asthma, allergy, GERD, OSAS, and extensive occupational exposure in the workplace (s/p 9/11), presenting to the office for a follow up visit s/p shoulder surgery (Dr. Sosa). He is symptomatic of OSAS and breathing upon exertion. \par \par His shortness of breath is multifactorial due to: \par -overweight/out of shape\par -poor breathing mechanics\par -asthma\par -?CAD\par \par problem 1: mild persistent asthma \par -continue Symbicort 160 at 2 inhalations BID\par - Continue Incruse 1 inhalation Qd\par -continue Singulair 10 mg before bed \par -Continue Ventolin as a rescue inhaler 2 inhalations pre-exercise\par -Asthma is  believed to be caused by inherited (genetic) and environmental factor, but its exact cause is unknown. Asthma may be triggered by allergens, lung infections, or irritants in the air. Asthma triggers are different for each person\par -Inhaler technique reviewed as well as oral hygiene techniques reviewed with patient. Avoidance of cold air, extremes of temperature, rescue inhaler should be used before exercise. Order of medication reviewed with patient. Recommended use of a cool mist humidifier in the bedroom. Instructed to gargle and spit after inhaler use. \par \par problem 2: allergies/sinus\par -continue to use Astelin 0.15 1 sniff each nostril BID\par - Continue Clarinex 5 mg QHS\par -Environmental measures for allergies were encouraged including mattress and pillow cover, air purifier, and environmental controls. \par \par problem 3: GERD\par -Add Pepcid 40 mg QHS\par -recommended to increase hydration\par -Things to avoid including overeating, spicy foods, tight clothing, eating within three hours of bed, this list is not all inclusive. \par -For treatment of reflux, possible options discussed including diet control, H2 blockers, PPIs, as well as coating motility agents discussed as treatment options. Timing of meals and proximity of last meal to sleep were discussed. If symptoms persist, a formal gastrointestinal evaluation is needed.\par -Rule of 2's: Avoid eating too late, too fast, too much, too spicy or within two hours of bedtime \par \par problem 4: moderate sleep apnea\par -his sleep study showed an AH of 24.4\par - Recommended to use Oxy Aid or the chin strap\par -being recommended to see Bambi Farrell / Warren Mcdaniel for an oral appliance (NONCOMPLIANT)\par -Sleep apnea is associated with adverse clinical consequences which an affect most organ systems.  Cardiovascular disease risk includes arrhythmias, atrial fibrillation, hypertension, coronary artery disease, and stroke. Metabolic disorders include diabetes type 2, non-alcoholic fatty liver disease. Mood disorder especially depression; and cognitive decline especially in the elderly. Associations with  chronic reflux/Meyers’s esophagus some but not all inclusive. \par -Reasons  include arousal consistent with hypopnea; respiratory events most prominent in REM sleep or supine position; therefore sleep staging and body position are important for accurate diagnosis and estimation of AHI. \par \par problem 5: overweight/out of shape\par -Weight loss, exercise, and diet control were discussed and are highly encouraged. Treatment options were \par given such as, aqua therapy, and contacting a nutritionist.\par \par problem 6: poor breathing mechanics\par -Proper breathing techniques were reviewed with an emphasis of exhalation. Patient instructed to breath in for 1 second and out for four seconds. Patient was encouraged to not talk while walking.\par \par problem 7: ?CAD\par -recommended to follow up with a cardiologist\par \par Problem 8: Abnormal CT \par - Script given for follow up CT scan of the chest 6/2020\par - CAT scans are the only radiological modality to identify abnormalities w/in the lungs with regards to nodules/masses/lymph nodes. Risks, benefits were reviewed in detail. The guidelines for abnormalities include follow up CT scans at various intervals which could range from 6 weeks to 1 year intervals. If there is a change for the worse then consideration for a biopsy will be considered if you are a candidate. Second opinion evaluation with a thoracic surgeon or an interventional radiologist could be offered.\par \par Problem 9: Health maintenance \par -s/p flu shot 2019\par -recommended strep pneumonia vaccines: Prevnar-13 vaccine, followed by Pneumo vaccine 23 one year following\par -recommended early intervention for URIs\par -recommended regular osteoporosis evaluations\par -recommended early dermatological evaluations\par -recommended after the age of 50 to the age of 70, colonoscopy every 5 years\par \par F/U in 4 months with SPI and NiOx\par He is encouraged to call with any changes, concerns, or questions

## 2020-03-03 NOTE — PROCEDURE
[FreeTextEntry1] : PFT revealed normal flows, with a FEV1 of 3.92 L, which is 99% of predicted, with a normal flow volume loop\par \par \par FENO: Patient refused NIOX

## 2020-03-23 NOTE — ASU PREOPERATIVE ASSESSMENT, ADULT ([facility] ONLY) - WEIGHT IN LBS
03/25/2020      Regarding :  Freya Roche  [id number]  [facility] [address]        To Who it may concern:      Freya Roche is an immunocompromised person with underlying medical conditions such as  DM-2 with neuropathy, HTN, dyslipidemia, GERD.       Sincerely,         Yumiko Lynne MD  [facility]  [address] [facility] [address]  Phone: [phone]  Fax: [phone]                               229.9

## 2020-09-08 ENCOUNTER — APPOINTMENT (OUTPATIENT)
Dept: PULMONOLOGY | Facility: CLINIC | Age: 60
End: 2020-09-08
Payer: COMMERCIAL

## 2020-09-08 VITALS
RESPIRATION RATE: 17 BRPM | WEIGHT: 240 LBS | HEIGHT: 72 IN | HEART RATE: 93 BPM | TEMPERATURE: 98 F | SYSTOLIC BLOOD PRESSURE: 120 MMHG | OXYGEN SATURATION: 97 % | DIASTOLIC BLOOD PRESSURE: 80 MMHG | BODY MASS INDEX: 32.51 KG/M2

## 2020-09-08 PROCEDURE — 99214 OFFICE O/P EST MOD 30 MIN: CPT

## 2020-09-08 RX ORDER — BUDESONIDE AND FORMOTEROL FUMARATE DIHYDRATE 160; 4.5 UG/1; UG/1
160-4.5 AEROSOL RESPIRATORY (INHALATION) TWICE DAILY
Qty: 3 | Refills: 1 | Status: ACTIVE | COMMUNITY
Start: 2018-07-13 | End: 1900-01-01

## 2020-09-08 NOTE — ASSESSMENT
[FreeTextEntry1] : Mr. Keen is a 60 year old male with a history of asthma, allergy, GERD, OSAS, and extensive occupational exposure in the workplace (s/p 9/11), presenting to the office for a follow up visit s/p shoulder surgery (Dr. Sosa). He is symptomatic of OSAS and breathing upon exertion - noncompliant\par \par His shortness of breath is multifactorial due to: \par -overweight/out of shape\par -poor breathing mechanics\par -asthma\par -?CAD\par \par problem 1: mild persistent asthma (active)\par -continue Symbicort 160 at 2 inhalations BID\par - Continue Incruse 1 inhalation Qd\par -continue Singulair 10 mg before bed \par -Continue Ventolin as a rescue inhaler 2 inhalations pre-exercise\par -Asthma is  believed to be caused by inherited (genetic) and environmental factor, but its exact cause is unknown. Asthma may be triggered by allergens, lung infections, or irritants in the air. Asthma triggers are different for each person\par -Inhaler technique reviewed as well as oral hygiene techniques reviewed with patient. Avoidance of cold air, extremes of temperature, rescue inhaler should be used before exercise. Order of medication reviewed with patient. Recommended use of a cool mist humidifier in the bedroom. Instructed to gargle and spit after inhaler use. \par \par problem 2: allergies/sinus\par -continue to use Astelin 0.15 1 sniff each nostril BID\par - Continue Clarinex 5 mg QHS\par -Environmental measures for allergies were encouraged including mattress and pillow cover, air purifier, and environmental controls. \par \par problem 3: GERD\par -Add Pepcid 40 mg QHS\par -recommended to increase hydration\par -Things to avoid including overeating, spicy foods, tight clothing, eating within three hours of bed, this list is not all inclusive. \par -For treatment of reflux, possible options discussed including diet control, H2 blockers, PPIs, as well as coating motility agents discussed as treatment options. Timing of meals and proximity of last meal to sleep were discussed. If symptoms persist, a formal gastrointestinal evaluation is needed.\par -Rule of 2's: Avoid eating too late, too fast, too much, too spicy or within two hours of bedtime \par \par problem 4: moderate sleep apnea\par -his sleep study showed an AH of 24.4\par - Recommended to use Oxy Aid or the chin strap\par -being recommended to see Bambi Farrell / Warren Mcdaniel for an oral appliance (NONCOMPLIANT)\par -Sleep apnea is associated with adverse clinical consequences which an affect most organ systems.  Cardiovascular disease risk includes arrhythmias, atrial fibrillation, hypertension, coronary artery disease, and stroke. Metabolic disorders include diabetes type 2, non-alcoholic fatty liver disease. Mood disorder especially depression; and cognitive decline especially in the elderly. Associations with  chronic reflux/Meyers’s esophagus some but not all inclusive. \par -Reasons  include arousal consistent with hypopnea; respiratory events most prominent in REM sleep or supine position; therefore sleep staging and body position are important for accurate diagnosis and estimation of AHI. \par \par problem 5: overweight/out of shape\par -Weight loss, exercise, and diet control were discussed and are highly encouraged. Treatment options were \par given such as, aqua therapy, and contacting a nutritionist.\par \par problem 6: poor breathing mechanics\par -Proper breathing techniques were reviewed with an emphasis of exhalation. Patient instructed to breath in for 1 second and out for four seconds. Patient was encouraged to not talk while walking.\par \par problem 7: ?CAD\par -recommended to follow up with a cardiologist\par \par Problem 8: Abnormal CT \par - Script given for follow up CT scan of the chest 6/2020\par - CAT scans are the only radiological modality to identify abnormalities w/in the lungs with regards to nodules/masses/lymph nodes. Risks, benefits were reviewed in detail. The guidelines for abnormalities include follow up CT scans at various intervals which could range from 6 weeks to 1 year intervals. If there is a change for the worse then consideration for a biopsy will be considered if you are a candidate. Second opinion evaluation with a thoracic surgeon or an interventional radiologist could be offered.\par \par Problem 9: Health maintenance \par -s/p flu shot 2019\par -recommended strep pneumonia vaccines: Prevnar-13 vaccine, followed by Pneumo vaccine 23 one year following\par -recommended early intervention for URIs\par -recommended regular osteoporosis evaluations\par -recommended early dermatological evaluations\par -recommended after the age of 50 to the age of 70, colonoscopy every 5 years\par \par F/U in 4 months with SPI and NiOx\par He is encouraged to call with any changes, concerns, or questions

## 2020-09-08 NOTE — ADDENDUM
[FreeTextEntry1] : Documented by Keith Little acting as a scribe for Dr. Garrett Wood on 09/08/2020.\par \par All medical record entries made by the Scribe were at my, Dr. Garrett Wood's, direction and personally dictated by me on 09/08/2020. I have reviewed the chart and agree that the record accurately reflects my personal performance of the history, physical exam, assessment and plan. I have also personally directed, reviewed, and agree with the discharge instructions. \par

## 2020-09-08 NOTE — HISTORY OF PRESENT ILLNESS
[FreeTextEntry1] : Mr. Keen is a 59 year old male presenting to the office for a follow up visit for abnormal CT, acid reflux disease, asthma, SUZY. His chief complaint is stress from dealing with his father's health\par -he reports having had difficulties with his father's health over the past few months\par -he reports he is getting 6-7 hours of sleep nightly, and wishes he could sleep more\par -he reports he gained a few pounds\par -his wife states his cough has worsened from a cough to clear to a proper cough\par -he notes he blows his nose frequently over the past month, and is unsure the cause. His wife believes he has allergies\par -he reports he wishes to lose weight\par -he reports he hasn’t been exercising\par -he states he uses his Symbicort PRN\par -he denies any chest pain, chest pressure, diarrhea, constipation, dysphagia, dizziness, sour taste in the mouth, heartburn, reflux

## 2020-09-08 NOTE — REVIEW OF SYSTEMS
[Negative] : Endocrine [Cough] : cough [Recent Wt Gain (___ Lbs)] : ~T recent [unfilled] lb weight gain [Wheezing] : no wheezing [Chest Discomfort] : no chest discomfort [GERD] : no gerd [Diarrhea] : no diarrhea [Constipation] : no constipation [Dizziness] : no dizziness [Dysphagia] : no dysphagia

## 2020-09-08 NOTE — PHYSICAL EXAM
[General Appearance - Well Developed] : well developed [Normal Appearance] : normal appearance [Well Groomed] : well groomed [General Appearance - Well Nourished] : well nourished [No Deformities] : no deformities [Normal Conjunctiva] : the conjunctiva exhibited no abnormalities [General Appearance - In No Acute Distress] : no acute distress [Eyelids - No Xanthelasma] : the eyelids demonstrated no xanthelasmas [III] : III [Normal Oropharynx] : normal oropharynx [Jugular Venous Distention Increased] : there was no jugular-venous distention [Neck Cervical Mass (___cm)] : no neck mass was observed [Neck Appearance] : the appearance of the neck was normal [Thyroid Diffuse Enlargement] : the thyroid was not enlarged [Thyroid Nodule] : there were no palpable thyroid nodules [Heart Sounds] : normal S1 and S2 [Heart Rate And Rhythm] : heart rate and rhythm were normal [Murmurs] : no murmurs present [Respiration, Rhythm And Depth] : normal respiratory rhythm and effort [Exaggerated Use Of Accessory Muscles For Inspiration] : no accessory muscle use [Auscultation Breath Sounds / Voice Sounds] : lungs were clear to auscultation bilaterally [Abdomen Soft] : soft [Abdomen Tenderness] : non-tender [Abnormal Walk] : normal gait [Abdomen Mass (___ Cm)] : no abdominal mass palpated [Cyanosis, Localized] : no localized cyanosis [Nail Clubbing] : no clubbing of the fingernails [Gait - Sufficient For Exercise Testing] : the gait was sufficient for exercise testing [Petechial Hemorrhages (___cm)] : no petechial hemorrhages [Skin Color & Pigmentation] : normal skin color and pigmentation [] : no rash [Skin Lesions] : no skin lesions [No Skin Ulcers] : no skin ulcer [No Venous Stasis] : no venous stasis [No Xanthoma] : no  xanthoma was observed [No Focal Deficits] : no focal deficits [Sensation] : the sensory exam was normal to light touch and pinprick [Deep Tendon Reflexes (DTR)] : deep tendon reflexes were 2+ and symmetric [Affect] : the affect was normal [Oriented To Time, Place, And Person] : oriented to person, place, and time [Impaired Insight] : insight and judgment were intact [FreeTextEntry1] : I:E 1:3, clear

## 2020-11-16 ENCOUNTER — APPOINTMENT (OUTPATIENT)
Dept: PULMONOLOGY | Facility: CLINIC | Age: 60
End: 2020-11-16
Payer: COMMERCIAL

## 2020-11-16 PROCEDURE — 99214 OFFICE O/P EST MOD 30 MIN: CPT | Mod: 95

## 2020-11-16 RX ORDER — FLUTICASONE PROPIONATE AND SALMETEROL 250; 50 UG/1; UG/1
250-50 POWDER RESPIRATORY (INHALATION)
Qty: 3 | Refills: 0 | Status: ACTIVE | COMMUNITY
Start: 2020-11-16 | End: 1900-01-01

## 2020-11-16 RX ORDER — ALBUTEROL SULFATE 90 UG/1
108 (90 BASE) AEROSOL, METERED RESPIRATORY (INHALATION) EVERY 6 HOURS
Qty: 3 | Refills: 5 | Status: ACTIVE | COMMUNITY
Start: 2019-01-08 | End: 1900-01-01

## 2020-11-16 NOTE — ADDENDUM
[FreeTextEntry1] : Documented by Avel Espinosa acting as a scribe for Dr. Garrett Wood on 11/16/2020.\par \par All medical record entries made by the Scribe were at my, Dr. Garrett Wood's, direction and personally dictated by me on 11/16/2020 . I have reviewed the chart and agree that the record accurately reflects my personal performance of the history, physical exam, assessment and plan. I have also personally directed, reviewed, and agree with the discharge instructions. \par

## 2020-11-16 NOTE — ASSESSMENT
[FreeTextEntry1] : Mr. Keen is a 60 year old male with a history of asthma, allergy, GERD, OSAS, and extensive occupational exposure in the workplace (s/p 9/11), video calling to the office for a follow up visit s/p shoulder surgery (Dr. Sosa). He is symptomatic due to COVID 19 infection . \par \par His shortness of breath is multifactorial due to: \par -overweight/out of shape\par -poor breathing mechanics\par -asthma\par -?CAD\par \par problem 1: mild persistent asthma (active) (stressed compliance 11/16/2020) \par -continue Symbicort 160 at 2 inhalations BID or Advair 250 1 inhalation BID\par - Continue Incruse 1 inhalation Qd\par -continue Singulair 10 mg before bed \par -Continue Ventolin as a rescue inhaler 2 inhalations pre-exercise\par -Asthma is  believed to be caused by inherited (genetic) and environmental factor, but its exact cause is unknown. Asthma may be triggered by allergens, lung infections, or irritants in the air. Asthma triggers are different for each person\par -Inhaler technique reviewed as well as oral hygiene techniques reviewed with patient. Avoidance of cold air, extremes of temperature, rescue inhaler should be used before exercise. Order of medication reviewed with patient. Recommended use of a cool mist humidifier in the bedroom. Instructed to gargle and spit after inhaler use. \par \par Problem 1A: COVID 19 infection\par -recommended positional sleep (rotating every couple of hours) \par -Add Tessalon Perles 200 mg Q8H \par -recommended baby Aspirin qday\par COVID-19 precautionary Immune Support Recommendations:\par -OTC Vitamin C 500mg BID \par -OTC Quercetin 250-500mg BID \par -OTC Zinc 75-100mg per day \par -OTC Melatonin 1 or 2mg a night \par -OTC Vitamin D 1-4000mg per day \par -OTC Tonic Water 8oz per day\par -Water 0.5-1 gallon per day\par -add Berberine 1000 BID\par \par Joint Support Supplements: \par -Liposomal Glutathione 500 mg BID\par -SPM 1 tablet BID \par -Turmeric \par \par problem 2: allergies/sinus\par -continue to use Astelin 0.15 1 sniff each nostril BID\par - Continue Clarinex 5 mg QHS\par -Environmental measures for allergies were encouraged including mattress and pillow cover, air purifier, and environmental controls. \par \par problem 3: GERD\par -continue Pepcid 40 mg QHS\par -recommended to increase hydration\par -Things to avoid including overeating, spicy foods, tight clothing, eating within three hours of bed, this list is not all inclusive. \par -For treatment of reflux, possible options discussed including diet control, H2 blockers, PPIs, as well as coating motility agents discussed as treatment options. Timing of meals and proximity of last meal to sleep were discussed. If symptoms persist, a formal gastrointestinal evaluation is needed.\par -Rule of 2's: Avoid eating too late, too fast, too much, too spicy or within two hours of bedtime \par \par problem 4: moderate sleep apnea \par -his sleep study showed an AH of 24.4\par - Recommended to use Oxy Aid or the chin strap\par -being recommended to see Bambi Farrell / Warren Mcdaniel for an oral appliance (NONCOMPLIANT)\par -Sleep apnea is associated with adverse clinical consequences which an affect most organ systems.  Cardiovascular disease risk includes arrhythmias, atrial fibrillation, hypertension, coronary artery disease, and stroke. Metabolic disorders include diabetes type 2, non-alcoholic fatty liver disease. Mood disorder especially depression; and cognitive decline especially in the elderly. Associations with  chronic reflux/Meyers’s esophagus some but not all inclusive. \par -Reasons  include arousal consistent with hypopnea; respiratory events most prominent in REM sleep or supine position; therefore sleep staging and body position are important for accurate diagnosis and estimation of AHI. \par \par problem 5: overweight/out of shape\par -Weight loss, exercise, and diet control were discussed and are highly encouraged. Treatment options were \par given such as, aqua therapy, and contacting a nutritionist.\par \par problem 6: poor breathing mechanics\par -Proper breathing techniques were reviewed with an emphasis of exhalation. Patient instructed to breath in for 1 second and out for four seconds. Patient was encouraged to not talk while walking.\par \par problem 7: ?CAD\par -recommended to follow up with a cardiologist\par \par Problem 8: Abnormal CT \par - Script given for follow up CT scan of the chest 6/2020\par - CAT scans are the only radiological modality to identify abnormalities w/in the lungs with regards to nodules/masses/lymph nodes. Risks, benefits were reviewed in detail. The guidelines for abnormalities include follow up CT scans at various intervals which could range from 6 weeks to 1 year intervals. If there is a change for the worse then consideration for a biopsy will be considered if you are a candidate. Second opinion evaluation with a thoracic surgeon or an interventional radiologist could be offered.\par \par Problem 9: Health maintenance \par -s/p flu shot 2019\par -recommended strep pneumonia vaccines: Prevnar-13 vaccine, followed by Pneumo vaccine 23 one year following\par -recommended early intervention for URIs\par -recommended regular osteoporosis evaluations\par -recommended early dermatological evaluations\par -recommended after the age of 50 to the age of 70, colonoscopy every 5 years\par \par F/U in 4 months with SPI and NiOx\par He is encouraged to call with any changes, concerns, or questions

## 2020-11-16 NOTE — REASON FOR VISIT
[Follow-Up] : a follow-up visit [Spouse] : spouse [FreeTextEntry1] : video call- Abnormal CT, acid reflux disease, asthma, SUZY

## 2020-11-16 NOTE — HISTORY OF PRESENT ILLNESS
[Home] : at home, [unfilled] , at the time of the visit. [Medical Office: ([facility])___] : at the medical office located in  [Verbal consent obtained from patient] : the patient, [unfilled] [FreeTextEntry1] : Mr. Keen is a 60 year old male video calling to the office for a follow up visit for abnormal CT, acid reflux disease, asthma, SUZY. His chief complaint is\par \par -he notes HA, body aches, fever 100.1 F, and COVID 19 positive result last week at [facility]\par -he notes intermittent cough\par -he notes increased mucus production, able to expectorate phlegm\par -he notes fatigue\par -he notes chills\par -he notes myalgias\par -he notes intermittent back pain\par -he notes senses of smell and taste are good \par -he denies visual issues\par -he denies vocal hoarseness\par -he notes sleep poor \par \par -denies any chest pain, chest pressure, diarrhea, constipation, dysphagia, sour taste in the mouth, dizziness, leg swelling, leg pain, arthralgias, itchy eyes, itchy ears, heartburn, or reflux.\par \par

## 2020-11-23 ENCOUNTER — INPATIENT (INPATIENT)
Facility: HOSPITAL | Age: 60
LOS: 4 days | Discharge: HOME | DRG: 177 | End: 2020-11-28
Attending: INTERNAL MEDICINE | Admitting: INTERNAL MEDICINE
Payer: COMMERCIAL

## 2020-11-23 VITALS
OXYGEN SATURATION: 93 % | WEIGHT: 229.94 LBS | TEMPERATURE: 103 F | DIASTOLIC BLOOD PRESSURE: 92 MMHG | SYSTOLIC BLOOD PRESSURE: 163 MMHG | RESPIRATION RATE: 20 BRPM | HEIGHT: 72 IN | HEART RATE: 110 BPM

## 2020-11-23 DIAGNOSIS — R06.02 SHORTNESS OF BREATH: ICD-10-CM

## 2020-11-23 DIAGNOSIS — Z20.828 CONTACT WITH AND (SUSPECTED) EXPOSURE TO OTHER VIRAL COMMUNICABLE DISEASES: ICD-10-CM

## 2020-11-23 DIAGNOSIS — D86.9 SARCOIDOSIS, UNSPECIFIED: ICD-10-CM

## 2020-11-23 DIAGNOSIS — Z98.890 OTHER SPECIFIED POSTPROCEDURAL STATES: Chronic | ICD-10-CM

## 2020-11-23 DIAGNOSIS — J45.909 UNSPECIFIED ASTHMA, UNCOMPLICATED: ICD-10-CM

## 2020-11-23 DIAGNOSIS — Z29.9 ENCOUNTER FOR PROPHYLACTIC MEASURES, UNSPECIFIED: ICD-10-CM

## 2020-11-23 LAB
ALBUMIN SERPL ELPH-MCNC: 3.4 G/DL — SIGNIFICANT CHANGE UP (ref 3.3–5)
ALBUMIN SERPL ELPH-MCNC: 3.8 G/DL — SIGNIFICANT CHANGE UP (ref 3.3–5)
ALP SERPL-CCNC: 77 U/L — SIGNIFICANT CHANGE UP (ref 40–120)
ALP SERPL-CCNC: 97 U/L — SIGNIFICANT CHANGE UP (ref 40–120)
ALT FLD-CCNC: 114 U/L — HIGH (ref 10–45)
ALT FLD-CCNC: 84 U/L — HIGH (ref 10–45)
ANION GAP SERPL CALC-SCNC: 13 MMOL/L — SIGNIFICANT CHANGE UP (ref 5–17)
ANION GAP SERPL CALC-SCNC: 13 MMOL/L — SIGNIFICANT CHANGE UP (ref 5–17)
ANION GAP SERPL CALC-SCNC: 15 MMOL/L — SIGNIFICANT CHANGE UP (ref 5–17)
APTT BLD: 30.7 SEC — SIGNIFICANT CHANGE UP (ref 27.5–35.5)
AST SERPL-CCNC: 38 U/L — SIGNIFICANT CHANGE UP (ref 10–40)
AST SERPL-CCNC: 75 U/L — HIGH (ref 10–40)
BASE EXCESS BLDV CALC-SCNC: 1.3 MMOL/L — SIGNIFICANT CHANGE UP (ref -2–2)
BASOPHILS # BLD AUTO: 0.01 K/UL — SIGNIFICANT CHANGE UP (ref 0–0.2)
BASOPHILS # BLD AUTO: 0.01 K/UL — SIGNIFICANT CHANGE UP (ref 0–0.2)
BASOPHILS NFR BLD AUTO: 0.1 % — SIGNIFICANT CHANGE UP (ref 0–2)
BASOPHILS NFR BLD AUTO: 0.1 % — SIGNIFICANT CHANGE UP (ref 0–2)
BILIRUB SERPL-MCNC: 0.6 MG/DL — SIGNIFICANT CHANGE UP (ref 0.2–1.2)
BILIRUB SERPL-MCNC: 0.6 MG/DL — SIGNIFICANT CHANGE UP (ref 0.2–1.2)
BUN SERPL-MCNC: 17 MG/DL — SIGNIFICANT CHANGE UP (ref 7–23)
BUN SERPL-MCNC: 19 MG/DL — SIGNIFICANT CHANGE UP (ref 7–23)
BUN SERPL-MCNC: 22 MG/DL — SIGNIFICANT CHANGE UP (ref 7–23)
CA-I SERPL-SCNC: 1.1 MMOL/L — LOW (ref 1.12–1.3)
CALCIUM SERPL-MCNC: 8.9 MG/DL — SIGNIFICANT CHANGE UP (ref 8.4–10.5)
CALCIUM SERPL-MCNC: 8.9 MG/DL — SIGNIFICANT CHANGE UP (ref 8.4–10.5)
CALCIUM SERPL-MCNC: 9.8 MG/DL — SIGNIFICANT CHANGE UP (ref 8.4–10.5)
CHLORIDE BLDV-SCNC: 105 MMOL/L — SIGNIFICANT CHANGE UP (ref 96–108)
CHLORIDE SERPL-SCNC: 103 MMOL/L — SIGNIFICANT CHANGE UP (ref 96–108)
CHLORIDE SERPL-SCNC: 103 MMOL/L — SIGNIFICANT CHANGE UP (ref 96–108)
CHLORIDE SERPL-SCNC: 98 MMOL/L — SIGNIFICANT CHANGE UP (ref 96–108)
CO2 BLDV-SCNC: 25 MMOL/L — SIGNIFICANT CHANGE UP (ref 22–30)
CO2 SERPL-SCNC: 20 MMOL/L — LOW (ref 22–31)
CO2 SERPL-SCNC: 21 MMOL/L — LOW (ref 22–31)
CO2 SERPL-SCNC: 23 MMOL/L — SIGNIFICANT CHANGE UP (ref 22–31)
CREAT SERPL-MCNC: 0.91 MG/DL — SIGNIFICANT CHANGE UP (ref 0.5–1.3)
CREAT SERPL-MCNC: 1.03 MG/DL — SIGNIFICANT CHANGE UP (ref 0.5–1.3)
CREAT SERPL-MCNC: 1.03 MG/DL — SIGNIFICANT CHANGE UP (ref 0.5–1.3)
CRP SERPL-MCNC: 16.54 MG/DL — HIGH (ref 0–0.4)
EOSINOPHIL # BLD AUTO: 0 K/UL — SIGNIFICANT CHANGE UP (ref 0–0.5)
EOSINOPHIL # BLD AUTO: 0 K/UL — SIGNIFICANT CHANGE UP (ref 0–0.5)
EOSINOPHIL NFR BLD AUTO: 0 % — SIGNIFICANT CHANGE UP (ref 0–6)
EOSINOPHIL NFR BLD AUTO: 0 % — SIGNIFICANT CHANGE UP (ref 0–6)
GAS PNL BLDV: 135 MMOL/L — SIGNIFICANT CHANGE UP (ref 135–145)
GAS PNL BLDV: SIGNIFICANT CHANGE UP
GLUCOSE BLDV-MCNC: 159 MG/DL — HIGH (ref 70–99)
GLUCOSE SERPL-MCNC: 104 MG/DL — HIGH (ref 70–99)
GLUCOSE SERPL-MCNC: 157 MG/DL — HIGH (ref 70–99)
GLUCOSE SERPL-MCNC: 275 MG/DL — HIGH (ref 70–99)
HCO3 BLDV-SCNC: 24 MMOL/L — SIGNIFICANT CHANGE UP (ref 21–29)
HCT VFR BLD CALC: 43.4 % — SIGNIFICANT CHANGE UP (ref 39–50)
HCT VFR BLD CALC: 51.6 % — HIGH (ref 39–50)
HCT VFR BLDA CALC: 46 % — SIGNIFICANT CHANGE UP (ref 39–50)
HGB BLD CALC-MCNC: 14.9 G/DL — SIGNIFICANT CHANGE UP (ref 13–17)
HGB BLD-MCNC: 14.2 G/DL — SIGNIFICANT CHANGE UP (ref 13–17)
HGB BLD-MCNC: 16.8 G/DL — SIGNIFICANT CHANGE UP (ref 13–17)
IMM GRANULOCYTES NFR BLD AUTO: 0.6 % — SIGNIFICANT CHANGE UP (ref 0–1.5)
IMM GRANULOCYTES NFR BLD AUTO: 0.7 % — SIGNIFICANT CHANGE UP (ref 0–1.5)
INR BLD: 1.24 RATIO — HIGH (ref 0.88–1.16)
LACTATE BLDV-MCNC: 1.4 MMOL/L — SIGNIFICANT CHANGE UP (ref 0.7–2)
LYMPHOCYTES # BLD AUTO: 0.34 K/UL — LOW (ref 1–3.3)
LYMPHOCYTES # BLD AUTO: 0.6 K/UL — LOW (ref 1–3.3)
LYMPHOCYTES # BLD AUTO: 3.9 % — LOW (ref 13–44)
LYMPHOCYTES # BLD AUTO: 5.5 % — LOW (ref 13–44)
MCHC RBC-ENTMCNC: 28.7 PG — SIGNIFICANT CHANGE UP (ref 27–34)
MCHC RBC-ENTMCNC: 28.8 PG — SIGNIFICANT CHANGE UP (ref 27–34)
MCHC RBC-ENTMCNC: 32.6 GM/DL — SIGNIFICANT CHANGE UP (ref 32–36)
MCHC RBC-ENTMCNC: 32.7 GM/DL — SIGNIFICANT CHANGE UP (ref 32–36)
MCV RBC AUTO: 88 FL — SIGNIFICANT CHANGE UP (ref 80–100)
MCV RBC AUTO: 88.1 FL — SIGNIFICANT CHANGE UP (ref 80–100)
MONOCYTES # BLD AUTO: 0.23 K/UL — SIGNIFICANT CHANGE UP (ref 0–0.9)
MONOCYTES # BLD AUTO: 0.36 K/UL — SIGNIFICANT CHANGE UP (ref 0–0.9)
MONOCYTES NFR BLD AUTO: 2.6 % — SIGNIFICANT CHANGE UP (ref 2–14)
MONOCYTES NFR BLD AUTO: 3.3 % — SIGNIFICANT CHANGE UP (ref 2–14)
NEUTROPHILS # BLD AUTO: 8.05 K/UL — HIGH (ref 1.8–7.4)
NEUTROPHILS # BLD AUTO: 9.9 K/UL — HIGH (ref 1.8–7.4)
NEUTROPHILS NFR BLD AUTO: 90.5 % — HIGH (ref 43–77)
NEUTROPHILS NFR BLD AUTO: 92.7 % — HIGH (ref 43–77)
NRBC # BLD: 0 /100 WBCS — SIGNIFICANT CHANGE UP (ref 0–0)
NRBC # BLD: 0 /100 WBCS — SIGNIFICANT CHANGE UP (ref 0–0)
PCO2 BLDV: 35 MMHG — SIGNIFICANT CHANGE UP (ref 35–50)
PH BLDV: 7.46 — HIGH (ref 7.35–7.45)
PLATELET # BLD AUTO: 139 K/UL — LOW (ref 150–400)
PLATELET # BLD AUTO: 177 K/UL — SIGNIFICANT CHANGE UP (ref 150–400)
PO2 BLDV: 60 MMHG — HIGH (ref 25–45)
POTASSIUM BLDV-SCNC: 4.3 MMOL/L — SIGNIFICANT CHANGE UP (ref 3.5–5.3)
POTASSIUM SERPL-MCNC: 4.2 MMOL/L — SIGNIFICANT CHANGE UP (ref 3.5–5.3)
POTASSIUM SERPL-MCNC: 4.4 MMOL/L — SIGNIFICANT CHANGE UP (ref 3.5–5.3)
POTASSIUM SERPL-MCNC: 7.2 MMOL/L — CRITICAL HIGH (ref 3.5–5.3)
POTASSIUM SERPL-SCNC: 4.2 MMOL/L — SIGNIFICANT CHANGE UP (ref 3.5–5.3)
POTASSIUM SERPL-SCNC: 4.4 MMOL/L — SIGNIFICANT CHANGE UP (ref 3.5–5.3)
POTASSIUM SERPL-SCNC: 7.2 MMOL/L — CRITICAL HIGH (ref 3.5–5.3)
PROCALCITONIN SERPL-MCNC: 0.21 NG/ML — HIGH (ref 0.02–0.1)
PROT SERPL-MCNC: 6.6 G/DL — SIGNIFICANT CHANGE UP (ref 6–8.3)
PROT SERPL-MCNC: 8.8 G/DL — HIGH (ref 6–8.3)
PROTHROM AB SERPL-ACNC: 14.7 SEC — HIGH (ref 10.6–13.6)
RBC # BLD: 4.93 M/UL — SIGNIFICANT CHANGE UP (ref 4.2–5.8)
RBC # BLD: 5.86 M/UL — HIGH (ref 4.2–5.8)
RBC # FLD: 12.1 % — SIGNIFICANT CHANGE UP (ref 10.3–14.5)
RBC # FLD: 12.2 % — SIGNIFICANT CHANGE UP (ref 10.3–14.5)
SAO2 % BLDV: 91 % — HIGH (ref 67–88)
SARS-COV-2 RNA SPEC QL NAA+PROBE: DETECTED
SODIUM SERPL-SCNC: 136 MMOL/L — SIGNIFICANT CHANGE UP (ref 135–145)
SODIUM SERPL-SCNC: 136 MMOL/L — SIGNIFICANT CHANGE UP (ref 135–145)
SODIUM SERPL-SCNC: 137 MMOL/L — SIGNIFICANT CHANGE UP (ref 135–145)
WBC # BLD: 10.94 K/UL — HIGH (ref 3.8–10.5)
WBC # BLD: 8.69 K/UL — SIGNIFICANT CHANGE UP (ref 3.8–10.5)
WBC # FLD AUTO: 10.94 K/UL — HIGH (ref 3.8–10.5)
WBC # FLD AUTO: 8.69 K/UL — SIGNIFICANT CHANGE UP (ref 3.8–10.5)

## 2020-11-23 PROCEDURE — 93010 ELECTROCARDIOGRAM REPORT: CPT

## 2020-11-23 PROCEDURE — 99223 1ST HOSP IP/OBS HIGH 75: CPT | Mod: AI

## 2020-11-23 PROCEDURE — 99285 EMERGENCY DEPT VISIT HI MDM: CPT | Mod: 25

## 2020-11-23 RX ORDER — ENOXAPARIN SODIUM 100 MG/ML
40 INJECTION SUBCUTANEOUS EVERY 12 HOURS
Refills: 0 | Status: DISCONTINUED | OUTPATIENT
Start: 2020-11-23 | End: 2020-11-28

## 2020-11-23 RX ORDER — SODIUM CHLORIDE 9 MG/ML
1000 INJECTION INTRAMUSCULAR; INTRAVENOUS; SUBCUTANEOUS ONCE
Refills: 0 | Status: COMPLETED | OUTPATIENT
Start: 2020-11-23 | End: 2020-11-23

## 2020-11-23 RX ORDER — ALBUTEROL 90 UG/1
2 AEROSOL, METERED ORAL
Qty: 0 | Refills: 0 | DISCHARGE

## 2020-11-23 RX ORDER — ASPIRIN/CALCIUM CARB/MAGNESIUM 324 MG
81 TABLET ORAL DAILY
Refills: 0 | Status: DISCONTINUED | OUTPATIENT
Start: 2020-11-23 | End: 2020-11-28

## 2020-11-23 RX ORDER — ALBUTEROL 90 UG/1
0 AEROSOL, METERED ORAL
Qty: 0 | Refills: 0 | DISCHARGE

## 2020-11-23 RX ORDER — ACETAMINOPHEN 500 MG
650 TABLET ORAL EVERY 4 HOURS
Refills: 0 | Status: DISCONTINUED | OUTPATIENT
Start: 2020-11-23 | End: 2020-11-28

## 2020-11-23 RX ORDER — ZINC SULFATE TAB 220 MG (50 MG ZINC EQUIVALENT) 220 (50 ZN) MG
220 TAB ORAL DAILY
Refills: 0 | Status: DISCONTINUED | OUTPATIENT
Start: 2020-11-23 | End: 2020-11-28

## 2020-11-23 RX ORDER — DEXAMETHASONE 0.5 MG/5ML
6 ELIXIR ORAL DAILY
Refills: 0 | Status: DISCONTINUED | OUTPATIENT
Start: 2020-11-24 | End: 2020-11-28

## 2020-11-23 RX ORDER — IBUPROFEN 200 MG
600 TABLET ORAL ONCE
Refills: 0 | Status: COMPLETED | OUTPATIENT
Start: 2020-11-23 | End: 2020-11-23

## 2020-11-23 RX ORDER — CHOLECALCIFEROL (VITAMIN D3) 125 MCG
1 CAPSULE ORAL
Qty: 0 | Refills: 0 | DISCHARGE

## 2020-11-23 RX ORDER — BUDESONIDE AND FORMOTEROL FUMARATE DIHYDRATE 160; 4.5 UG/1; UG/1
2 AEROSOL RESPIRATORY (INHALATION)
Refills: 0 | Status: DISCONTINUED | OUTPATIENT
Start: 2020-11-23 | End: 2020-11-28

## 2020-11-23 RX ORDER — ZINC ACETATE DIHYDRATE 100 %
1 CRYSTALS MISCELLANEOUS
Qty: 0 | Refills: 0 | DISCHARGE

## 2020-11-23 RX ORDER — ALBUTEROL 90 UG/1
2 AEROSOL, METERED ORAL ONCE
Refills: 0 | Status: COMPLETED | OUTPATIENT
Start: 2020-11-23 | End: 2020-11-23

## 2020-11-23 RX ORDER — ASCORBIC ACID 60 MG
1 TABLET,CHEWABLE ORAL
Qty: 0 | Refills: 0 | DISCHARGE

## 2020-11-23 RX ORDER — DEXAMETHASONE 0.5 MG/5ML
10 ELIXIR ORAL ONCE
Refills: 0 | Status: COMPLETED | OUTPATIENT
Start: 2020-11-23 | End: 2020-11-23

## 2020-11-23 RX ORDER — BUDESONIDE AND FORMOTEROL FUMARATE DIHYDRATE 160; 4.5 UG/1; UG/1
2 AEROSOL RESPIRATORY (INHALATION)
Qty: 0 | Refills: 0 | DISCHARGE

## 2020-11-23 RX ORDER — CHOLECALCIFEROL (VITAMIN D3) 125 MCG
2000 CAPSULE ORAL DAILY
Refills: 0 | Status: DISCONTINUED | OUTPATIENT
Start: 2020-11-23 | End: 2020-11-28

## 2020-11-23 RX ORDER — ALBUTEROL 90 UG/1
2 AEROSOL, METERED ORAL EVERY 6 HOURS
Refills: 0 | Status: DISCONTINUED | OUTPATIENT
Start: 2020-11-23 | End: 2020-11-24

## 2020-11-23 RX ORDER — ASCORBIC ACID 60 MG
500 TABLET,CHEWABLE ORAL DAILY
Refills: 0 | Status: DISCONTINUED | OUTPATIENT
Start: 2020-11-23 | End: 2020-11-28

## 2020-11-23 RX ORDER — ASPIRIN/CALCIUM CARB/MAGNESIUM 324 MG
1 TABLET ORAL
Qty: 0 | Refills: 0 | DISCHARGE

## 2020-11-23 RX ORDER — ZINC SULFATE TAB 220 MG (50 MG ZINC EQUIVALENT) 220 (50 ZN) MG
1 TAB ORAL
Qty: 0 | Refills: 0 | DISCHARGE

## 2020-11-23 RX ADMIN — ALBUTEROL 2 PUFF(S): 90 AEROSOL, METERED ORAL at 15:55

## 2020-11-23 RX ADMIN — Medication 600 MILLIGRAM(S): at 16:01

## 2020-11-23 RX ADMIN — SODIUM CHLORIDE 1000 MILLILITER(S): 9 INJECTION INTRAMUSCULAR; INTRAVENOUS; SUBCUTANEOUS at 15:20

## 2020-11-23 RX ADMIN — Medication 600 MILLIGRAM(S): at 15:21

## 2020-11-23 RX ADMIN — Medication 102 MILLIGRAM(S): at 15:21

## 2020-11-23 NOTE — ED ADULT NURSE NOTE - NSIMPLEMENTINTERV_GEN_ALL_ED
Implemented All Universal Safety Interventions:  [place] to call system. Call bell, personal items and telephone within reach. Instruct patient to call for assistance. Room bathroom lighting operational. Non-slip footwear when patient is off stretcher. Physically safe environment: no spills, clutter or unnecessary equipment. Stretcher in lowest position, wheels locked, appropriate side rails in place.

## 2020-11-23 NOTE — ED ADULT NURSE NOTE - OBJECTIVE STATEMENT
Patient presents to Ed with c/o sob. Patient reports he was diagnosed Covid positive November 17th and has been  experiencing 101-103 fever no relief from Tylenol and increasingly sob with low O2 sat taken at home. Patient denies  chest pain +sob, no nausea vomiting +fever with chills no headache or dizziness. Lt hand 20g iv lock placed labs  drawn and sent.

## 2020-11-23 NOTE — H&P ADULT - NSHPLABSRESULTS_GEN_ALL_CORE
Personally reviewed labs.   Personally reviewed imaging.   Personally reviewed EKG.                           16.8   10.94 )-----------( 139      ( 23 Nov 2020 15:48 )             51.6       11-23    136  |  103  |  19  ----------------------------<  157<H>  4.4   |  20<L>  |  1.03    Ca    8.9      23 Nov 2020 18:27  TPro  8.8<H>  /  Alb  3.8  /  TBili  0.6  /  DBili  x   /  AST  75<H>  /  ALT  114<H>  /  AlkPhos  97  11-23    LIVER FUNCTIONS - ( 23 Nov 2020 15:48 )  Alb: 3.8 g/dL / Pro: 8.8 g/dL / ALK PHOS: 97 U/L / ALT: 114 U/L / AST: 75 U/L / GGT: x           PT/INR - ( 23 Nov 2020 15:48 )   PT: 14.7 sec;   INR: 1.24 ratio    PTT - ( 23 Nov 2020 15:48 )  PTT:30.7 sec    D dimer- 300  Fibrinogen 1148  CRP 16.54  Procal 0.21    EKG: NSR, no acute ischemia  CXR: b/l mild patchy opacities, awaiting official read

## 2020-11-23 NOTE — ED PROVIDER NOTE - PROGRESS NOTE DETAILS
PMD is [facility] (Dr. Louis Dubois). Pt O2 hovering 90-93% on room air, tachypneic even when talking, given degree of symptoms and O2 level will admit. Endorsed to [facility] hospitalist Dr. Angela. - Todd Jones PA-C

## 2020-11-23 NOTE — H&P ADULT - ASSESSMENT
60 yr old gentleman w/ recent diagnosis of Covid 19 presents with worsening shortness of breath and fevers.

## 2020-11-23 NOTE — ED PROVIDER NOTE - CARE PLAN
Principal Discharge DX:	Shortness of breath  Secondary Diagnosis:	Suspected COVID-19 virus infection

## 2020-11-23 NOTE — ED PROVIDER NOTE - ATTENDING CONTRIBUTION TO CARE
Attending MD Zamorano.  Agree with HPI and progress note as authored by STELLA Brooks.  Pt hemodynamically stable but benefiting from O2 2/2 inc WOB.  Concern for potential to worsen resp status 2/2 ~day 10 of sxs and inability to speak/deeply inspire without cough.  WOB improved at this time with 2-3L NC.  Pt and family amenable to admission for supportive care.  Planned CTA 2/2 markedly elevated D-dimer 2/2 either COVID alone or COVID + PE.  Pt stable at time of signout to incoming team pending CTA and admission. Attending MD Zamorano.  Agree with HPI and progress note as authored by STELLA Brooks.  Pt hemodynamically stable but benefiting from O2 2/2 inc WOB.  Concern for potential to worsen resp status 2/2 ~day 10 of sxs and inability to speak/deeply inspire without cough.  WOB improved at this time with 2-3L NC.  Pt and family amenable to admission for supportive care.   Pt stable at time of admission to medicine.

## 2020-11-23 NOTE — CONSULT NOTE ADULT - ASSESSMENT
59 y/o M w/hx of lung nodule s/p resection, asthma, and SUZY presenting with COVID-19 infection. Patient saturating ok on room air in low-mid 90s. D. Dimer mildly elevated. Hyperkalemia likely secondary to hemolyzed sample. Chest pain likely pleurisy.    - Supportive care  - Tylenol for pain control  - Recommend against steroids as patient is not hypoxemic  - Repeat chemistry  - No pulmonary contraindication to discharge

## 2020-11-23 NOTE — H&P ADULT - NSHPREVIEWOFSYSTEMS_GEN_ALL_CORE
REVIEW OF SYSTEMS:  CONSTITUTIONAL: +fatigue and fevers. No chills. No rigors. No weight loss. No night sweats. No poor appetite.  EYES: No blurry or double vision. No eye pain.  ENT: No hearing difficulty. No vertigo. No dysphagia. No sore throat. No Sinusitis/rhinorrhea.   NECK: No pain. No stiffness/rigidity.  CARDIAC: +Cp w/ deep inspiration. No palpitations. No lightheadedness. No syncope.  RESPIRATORY: + cough. + SOB. No hemoptysis.  GASTROINTESTINAL: No abdominal pain. + nausea. No vomiting. No hematemesis. No diarrhea. No constipation. No melena. No hematochezia.  GENITOURINARY: No dysuria. No frequency. No hesitancy. No hematuria. No oliguria.  NEUROLOGICAL: No numbness/tingling. No focal weakness. No urinary or fecal incontinence. No headache. No unsteady gait.  BACK: No back pain. No flank pain.  EXTREMITIES: No lower extremity edema. Full ROM. No joint pain.  SKIN: No rashes. No itching. No other lesions.  PSYCHIATRIC: No depression. No anxiety. No SI/HI.  ALLERGIC: No lip swelling. No hives.  All other review of systems is negative unless indicated above.  Unless indicated above, unable to assess ROS 2/2

## 2020-11-23 NOTE — H&P ADULT - PROBLEM SELECTOR PLAN 1
-likely secondary to covid 19; recent outpt diagnosis on 11/13; conversationally dyspneic w/ dec 02 saturation to low 90s on RA  - f/up covid pcr and ab; if neg, obtain Rvp  - supportive care- albuterol 2 puffs q6h prn  - incentive spirometer  - rest as per covid management below

## 2020-11-23 NOTE — H&P ADULT - NSHPPHYSICALEXAM_GEN_ALL_CORE
PHYSICAL EXAM:   GENERAL: Alert. Not confused. No acute distress. Not thin. Not cachectic. Not obese.  HEAD:  Atraumatic. Normocephalic.  EYES: EOMI. PERRLA. Normal conjunctiva/sclera.  ENT: Neck supple. No JVD. Moist oral mucosa. Not edentulous. No thrush.  LYMPH: Normal supraclavicular/cervical lymph nodes.   CARDIAC: No tachy, Not sandhya. Regular rhythm. Not irregularly irregular. S1. S2. No murmur. No rub. No distant heart sounds.  LUNG/CHEST: CTAB. No increased work of breathing. BS equal bilaterally. No wheezes. No rales. No rhonchi.  ABDOMEN: Soft. No tenderness. No distension. No fluid wave. Normal bowel sounds.  BACK: No midline/vertebral tenderness. No flank tenderness.  VASCULAR: +2 b/l radial or ulnar pulses. Palpable DP pulses.  EXTREMITIES:  No clubbing. No cyanosis. No edema. Moving all 4.  NEUROLOGY: A&Ox3. Non-focal exam. Cranial nerves intact. Normal speech. Sensation intact.  PSYCH: Normal behavior. Normal affect.  SKIN: No jaundice. No erythema. No rash/lesion.  Vascular Access:     ICU Vital Signs Last 24 Hrs  T(C): 36.8 (23 Nov 2020 19:58), Max: 39.5 (23 Nov 2020 14:13)  T(F): 98.2 (23 Nov 2020 19:58), Max: 103.1 (23 Nov 2020 14:13)  HR: 81 (23 Nov 2020 19:58) (81 - 110)  BP: 141/87 (23 Nov 2020 19:58) (141/87 - 163/92)  BP(mean): --  ABP: --  ABP(mean): --  RR: 20 (23 Nov 2020 19:58) (18 - 20)  SpO2: 93% (23 Nov 2020 19:58) (93% - 94%)      I&O's Summary

## 2020-11-23 NOTE — H&P ADULT - PROBLEM SELECTOR PLAN 2
positive outpt covid pcr on 11/13; w/ sx as above; mild D dimer elevation; CXR w/ patchy opacities; currently maintaining O2 sat on RA.  Initial inflammatory labs: D dimer- 300; Fibrinogen 1148; CRP 16.54; Procal 0.21  - started on pred 30 mg (equiv to dexameth 4.5 mg)on 11/19 by pulm  - initiate w/ supplemental O2 if spO2 sat< 93%  - will currently continue dexameth 6 mg daily (total 10 days steroids; will need 5 more days) due to presenting sx and cxr, if improving, can d/c  - if becomes further hypoxic, will initiate remdesivir   - monitor covid labs q 72h  - a/c w/ enoxaparin bid

## 2020-11-23 NOTE — H&P ADULT - NSICDXPASTMEDICALHX_GEN_ALL_CORE_FT
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

## 2020-11-23 NOTE — CONSULT NOTE ADULT - SUBJECTIVE AND OBJECTIVE BOX
CHIEF COMPLAINT: COVID 19, fever, chest pain with deep inhalation    HPI: 59 y/o M w/hx of lung nodule s/p resection, asthma, and SUZY presenting with COVID-19 infection. Patient reports he was diagnosed ~ 1.5 weeks ago. Since then he has progressively felt worse. Currently he reports continued fever, chest pain with deep inspiration, and nausea. No change in smell, diarrhea, or dyspnea.     ROS:  See above. ROS otherwise negative.    PAST MEDICAL & SURGICAL HISTORY:  GERD (gastroesophageal reflux disease)    Lung nodules    Asthma    SUZY (obstructive sleep apnea)    Borderline hypertension    Sarcoidosis  9/11 related    S/P bilateral foot surgery  11/16  ; Right Foot Plantar Fascitis ; left foot bone spur    History of lung biopsy  Right Thoracoscopy 2004        FAMILY HISTORY:  Family history of memory loss (Mother)    Family history of prostate cancer in father (Father)        SOCIAL HISTORY:  No tobacco or drug use    Allergies    No Known Allergies    Intolerances        HOME MEDICATIONS:  Home Medications:  Symbicort 160 mcg-4.5 mcg/inh inhalation aerosol: 2 puff(s) inhaled 1-2 x  a day (14 Jan 2019 08:17)  Ventolin: 1-2 puffs prn (14 Jan 2019 08:17)    OBJECTIVE:  ICU Vital Signs Last 24 Hrs  T(C): 36.8 (23 Nov 2020 15:38), Max: 39.5 (23 Nov 2020 14:13)  T(F): 98.3 (23 Nov 2020 15:38), Max: 103.1 (23 Nov 2020 14:13)  HR: 89 (23 Nov 2020 15:38) (89 - 110)  BP: 153/78 (23 Nov 2020 15:38) (153/78 - 163/92)  BP(mean): --  ABP: --  ABP(mean): --  RR: 20 (23 Nov 2020 15:38) (20 - 20)  SpO2: 93% (23 Nov 2020 15:38) (93% - 93%)        CAPILLARY BLOOD GLUCOSE          PHYSICAL EXAM:  General: Adult male lying comfortably in bed, NAD  HEENT: NC/AT sclerae anicteric  Neck: Supple  Respiratory: No increased WOB, CTAB  Cardiovascular: S1, S2  Abdomen: Soft, + BS  Extremities: WWP  Skin: Intact  Neurological: Awake, alert, follows commands  Psychiatry: Appropriate affect    HOSPITAL MEDICATIONS:  Standing Meds:      PRN Meds:      LABS:                        16.8   10.94 )-----------( 139      ( 23 Nov 2020 15:48 )             51.6     Hgb Trend: 16.8<--  11-23    136  |  98  |  17  ----------------------------<  104<H>  7.2<HH>   |  23  |  0.91    Ca    9.8      23 Nov 2020 15:48    TPro  8.8<H>  /  Alb  3.8  /  TBili  0.6  /  DBili  x   /  AST  x   /  ALT  x   /  AlkPhos  97  11-23    Creatinine Trend: 0.91<--  PT/INR - ( 23 Nov 2020 15:48 )   PT: 14.7 sec;   INR: 1.24 ratio         PTT - ( 23 Nov 2020 15:48 )  PTT:30.7 sec          MICROBIOLOGY:       RADIOLOGY:  [x ] Reviewed and interpreted by me    PULMONARY FUNCTION TESTS:    EKG:

## 2020-11-23 NOTE — ED ADULT NURSE REASSESSMENT NOTE - NS ED NURSE REASSESS COMMENT FT1
Report received from JODY Burns. Pt A+Bean, VSS, presented to ED c/o SOB and known COVID +. Pt aware of plan of care, admitted to medicine for SOB, report given to JODY Obrien in PICU.

## 2020-11-23 NOTE — H&P ADULT - NSICDXPASTSURGICALHX_GEN_ALL_CORE_FT
PAST SURGICAL HISTORY:  History of lung biopsy Right Thoracoscopy 2004    S/P bilateral foot surgery 11/16  ; Right Foot Plantar Fascitis ; left foot bone spur

## 2020-11-23 NOTE — ED PROVIDER NOTE - OBJECTIVE STATEMENT
61 yo male PMhx HLD, 61 yo male PMhx HLD, lung nodules s/p resection (from 9/11, not mesothelioma), diagnosed COVID + last week presents to the ED c/o shortness of breath. Was diagnosed on 11/17, started on steroids on 11/19 by his pulmonologist. Temp has been 101-103 despite tylenol around the clock. Wife is NP and has been monitoring sats and noted they dropped to low 90s today and pt was very short of breath even talking, prompting visit. +nausea, fatigue, generalized weakness. Denies headache, chest pain, palpitations, vomiting, diarrhea, abd pain, urinary urgency, frequency, dysuria, calf pain, LE swelling, hemoptysis.

## 2020-11-23 NOTE — ED PROVIDER NOTE - PSH
History of lung biopsy  Right Thoracoscopy 2004  S/P bilateral foot surgery  11/16  ; Right Foot Plantar Fascitis ; left foot bone spur

## 2020-11-23 NOTE — H&P ADULT - HISTORY OF PRESENT ILLNESS
60 yr old male w/ PMH of "elevated cholesterol" (not on medications), lung nodules s/p resection (from 9/11, not mesothelioma), asthma, and SUZY with recent diagnosis of COVID (+ pcr on 11/13) presents with shortness of breath. Patient notes that he initially had fevers but since then has overall progressively worsened.  Initially fever low grade, but in the last 3 days has ranged from 101-102.9F despite tylenol q6h.      Also notes that he has been feeling coversationally dyspneic. He is unable to comment if he is dyspneic w/ exertion as he has been quarantining in a room in his home and note walking/exerting self much. He endorses chest pain w/ deep inspiration.     Wife has been monitoring O2 sats and noted they dropped to low 90s today and tachypneic w/ RR 20-24, prompting visit to ED.     Further endorses fatigue/ generalized weakness and nausea. Denies change in taste, smell, abd pain or diarrhea.     Of note, patient had been started on steroids (prednisone 30 mg) by his pulmonologist on 11/19.     In ED, pt's O2 hovered 90-93% on room air w/ notable tachypnea.      EKG: NSR, no acute ischemia  CXR: b/l patchy opacities, awaiting official read

## 2020-11-23 NOTE — H&P ADULT - NSHPSOCIALHISTORY_GEN_ALL_CORE
vit d 1000  zinc  vit c 1000    symbicort 160/ 4.5  pos 11/13 11/19 pred 30 mg  rr 20-24  02 90-92 Never smoker, occasion etoh  , lives w/ wife

## 2020-11-24 DIAGNOSIS — U07.1 COVID-19: ICD-10-CM

## 2020-11-24 LAB
ALBUMIN SERPL ELPH-MCNC: 3.4 G/DL — SIGNIFICANT CHANGE UP (ref 3.3–5)
ALP SERPL-CCNC: 77 U/L — SIGNIFICANT CHANGE UP (ref 40–120)
ALT FLD-CCNC: 71 U/L — HIGH (ref 10–45)
AST SERPL-CCNC: 28 U/L — SIGNIFICANT CHANGE UP (ref 10–40)
BILIRUB DIRECT SERPL-MCNC: 0.1 MG/DL — SIGNIFICANT CHANGE UP (ref 0–0.2)
BILIRUB INDIRECT FLD-MCNC: 0.4 MG/DL — SIGNIFICANT CHANGE UP (ref 0.2–1)
BILIRUB SERPL-MCNC: 0.5 MG/DL — SIGNIFICANT CHANGE UP (ref 0.2–1.2)
CREAT SERPL-MCNC: 0.9 MG/DL — SIGNIFICANT CHANGE UP (ref 0.5–1.3)
FERRITIN SERPL-MCNC: 1563 NG/ML — HIGH (ref 30–400)
HCV AB S/CO SERPL IA: 0.15 S/CO — SIGNIFICANT CHANGE UP (ref 0–0.99)
HCV AB SERPL-IMP: SIGNIFICANT CHANGE UP
PROT SERPL-MCNC: 6.8 G/DL — SIGNIFICANT CHANGE UP (ref 6–8.3)
SARS-COV-2 IGG SERPL QL IA: POSITIVE
SARS-COV-2 IGM SERPL IA-ACNC: 5.98 INDEX — HIGH

## 2020-11-24 PROCEDURE — 99233 SBSQ HOSP IP/OBS HIGH 50: CPT

## 2020-11-24 RX ORDER — REMDESIVIR 5 MG/ML
INJECTION INTRAVENOUS
Refills: 0 | Status: COMPLETED | OUTPATIENT
Start: 2020-11-24 | End: 2020-11-28

## 2020-11-24 RX ORDER — REMDESIVIR 5 MG/ML
200 INJECTION INTRAVENOUS EVERY 24 HOURS
Refills: 0 | Status: COMPLETED | OUTPATIENT
Start: 2020-11-24 | End: 2020-11-24

## 2020-11-24 RX ORDER — TIOTROPIUM BROMIDE 18 UG/1
1 CAPSULE ORAL; RESPIRATORY (INHALATION) DAILY
Refills: 0 | Status: DISCONTINUED | OUTPATIENT
Start: 2020-11-24 | End: 2020-11-28

## 2020-11-24 RX ORDER — REMDESIVIR 5 MG/ML
100 INJECTION INTRAVENOUS EVERY 24 HOURS
Refills: 0 | Status: COMPLETED | OUTPATIENT
Start: 2020-11-25 | End: 2020-11-28

## 2020-11-24 RX ORDER — IPRATROPIUM/ALBUTEROL SULFATE 18-103MCG
3 AEROSOL WITH ADAPTER (GRAM) INHALATION EVERY 6 HOURS
Refills: 0 | Status: DISCONTINUED | OUTPATIENT
Start: 2020-11-24 | End: 2020-11-24

## 2020-11-24 RX ORDER — MONTELUKAST 4 MG/1
10 TABLET, CHEWABLE ORAL DAILY
Refills: 0 | Status: DISCONTINUED | OUTPATIENT
Start: 2020-11-24 | End: 2020-11-28

## 2020-11-24 RX ORDER — ALBUTEROL 90 UG/1
1 AEROSOL, METERED ORAL EVERY 6 HOURS
Refills: 0 | Status: DISCONTINUED | OUTPATIENT
Start: 2020-11-24 | End: 2020-11-28

## 2020-11-24 RX ADMIN — Medication 2000 UNIT(S): at 11:32

## 2020-11-24 RX ADMIN — MONTELUKAST 10 MILLIGRAM(S): 4 TABLET, CHEWABLE ORAL at 11:31

## 2020-11-24 RX ADMIN — ZINC SULFATE TAB 220 MG (50 MG ZINC EQUIVALENT) 220 MILLIGRAM(S): 220 (50 ZN) TAB at 11:32

## 2020-11-24 RX ADMIN — BUDESONIDE AND FORMOTEROL FUMARATE DIHYDRATE 2 PUFF(S): 160; 4.5 AEROSOL RESPIRATORY (INHALATION) at 05:43

## 2020-11-24 RX ADMIN — Medication 81 MILLIGRAM(S): at 11:31

## 2020-11-24 RX ADMIN — ENOXAPARIN SODIUM 40 MILLIGRAM(S): 100 INJECTION SUBCUTANEOUS at 05:43

## 2020-11-24 RX ADMIN — REMDESIVIR 500 MILLIGRAM(S): 5 INJECTION INTRAVENOUS at 10:35

## 2020-11-24 RX ADMIN — Medication 500 MILLIGRAM(S): at 11:31

## 2020-11-24 RX ADMIN — BUDESONIDE AND FORMOTEROL FUMARATE DIHYDRATE 2 PUFF(S): 160; 4.5 AEROSOL RESPIRATORY (INHALATION) at 17:02

## 2020-11-24 RX ADMIN — Medication 6 MILLIGRAM(S): at 05:43

## 2020-11-24 RX ADMIN — ENOXAPARIN SODIUM 40 MILLIGRAM(S): 100 INJECTION SUBCUTANEOUS at 17:02

## 2020-11-24 NOTE — PROGRESS NOTE ADULT - ATTENDING COMMENTS
as above:  multifactorial dyspnea-resp failure--covid 19 pneumonitis, asthma, obesity, debility--O2 -keep sat above 90%  covid 19 pneumonitis--would initiate remdisivir and dexameth protocol (pt is worse than admitting), zn, vit d/c, melatonin, tylenol  asthma-symbicort, spiriva, singulair          Allergy-claritin/flonase  osas-out pt DD  DVT prophylaxis-enoxaparin   GI-pepcid 20 bid      OOB as able    BP control  Garrett Wood MD-Pulmonary   [phone]

## 2020-11-24 NOTE — PROGRESS NOTE ADULT - PROBLEM SELECTOR PLAN 1
2' COVID-19; recent outpt diagnosis on 11/13; conversationally dyspneic w/ dec 02 saturation to low 90s on RA  still COVID-19 PCR (+) and COVID-19 Ab is (+) as well  he received decadron 10 mg IVP x 1 on 11/23 and started 6 mg IVP daily on 11/24  he started remdesivir on 11/24/20 x 5 days  duonebs q6h ATC in lieu of his handheld inhalers  incentive spirometer  maintain O2 sats above 92%  rest as per covid management below 2' COVID-19; recent outpt diagnosis on 11/13; conversationally dyspneic w/ dec 02 saturation to low 90s on RA  still COVID-19 PCR (+) here and COVID-19 Ab is (+) as well  he received decadron 10 mg IVP x 1 on 11/23 and started 6 mg IVP daily on 11/24  he started remdesivir on 11/24/20 x 5 days  duonebs q6h ATC in lieu of his handheld inhalers  incentive spirometer  maintain O2 sats above 92%  rest as per covid management below

## 2020-11-24 NOTE — PROGRESS NOTE ADULT - ASSESSMENT
59 y/o M w/hx of lung nodule s/p resection, asthma, and SUZY presenting with COVID-19 infection. Patient saturating ok on room air in low-mid 90s. D. Dimer mildly elevated. Hyperkalemia likely secondary to hemolyzed sample. Chest pain likely pleurisy.  *******************  11/24-remains hypoxemic w/exertion

## 2020-11-24 NOTE — PROGRESS NOTE ADULT - PROBLEM SELECTOR PLAN 2
positive outpt covid pcr on 11/13; w/ sx as above; mild D dimer elevation; CXR w/ patchy opacities; currently maintaining O2 sat on RA.  Initial inflammatory labs: D dimer- 300; Fibrinogen 1148; CRP 16.54; Procal 0.21  started on pred 30 mg (equiv to dexameth 4.5 mg) on 11/19 by pulm  he received decadron 10 mg IVP x 1 on 11/23 and started 6 mg IVP daily on 11/24  he started remdesivir on 11/24/20 x 5 days  duonebs q6h ATC in lieu of his handheld inhalers  incentive spirometer  maintain O2 sats above 92%  cont with enoxaparin bid  inflammatory markers q3days  appreciate pulmonology positive outpt covid pcr on 11/13; w/ sx as above; mild D dimer elevation; CXR w/ patchy opacities; currently maintaining O2 sat on RA.  still COVID-19 PCR (+) here and COVID-19 Ab is (+) as well  initial inflammatory labs: D dimer- 300; Fibrinogen 1148; CRP 16.54; Procal 0.21  started on pred 30 mg (equiv to dexameth 4.5 mg) on 11/19 by pulm  he received decadron 10 mg IVP x 1 on 11/23 and started 6 mg IVP daily on 11/24  he started remdesivir on 11/24/20 x 5 days  duonebs q6h ATC in lieu of his handheld inhalers  incentive spirometer  maintain O2 sats above 92%  cont with enoxaparin bid  inflammatory markers q3days  appreciate pulmonology

## 2020-11-24 NOTE — PROGRESS NOTE ADULT - SUBJECTIVE AND OBJECTIVE BOX
CHIEF COMPLAINT: f/up covid 19 pneumonitis, asthma, allergy, gerd, osas--sob on exertion and cough present    Interval Events: dexamethasone    REVIEW OF SYSTEMS:  Constitutional: No fevers or chills. No weight loss. + fatigue or generalized malaise.  Eyes: No itching or discharge from the eyes  ENT: No ear pain. No ear discharge. No nasal congestion. No post nasal drip. No epistaxis. No throat pain. No sore throat. No difficulty swallowing.   CV: No chest pain. No palpitations. No lightheadedness or dizziness.   Resp: No dyspnea at rest. + dyspnea on exertion. No orthopnea. No wheezing. No cough. No stridor. No sputum production. No chest pain with respiration.  GI: No nausea. No vomiting. No diarrhea.  MSK: No joint pain or pain in any extremities  Integumentary: No skin lesions. No pedal edema.  Neurological: No gross motor weakness. No sensory changes.  [+ ] All other systems negative  [ ] Unable to assess ROS because ________    OBJECTIVE:  ICU Vital Signs Last 24 Hrs  T(C): 36.7 (24 Nov 2020 05:35), Max: 39.5 (23 Nov 2020 14:13)  T(F): 98 (24 Nov 2020 05:35), Max: 103.1 (23 Nov 2020 14:13)  HR: 70 (24 Nov 2020 05:35) (70 - 110)  BP: 160/90 (24 Nov 2020 05:35) (141/87 - 163/92)  BP(mean): --  ABP: --  ABP(mean): --  RR: 20 (24 Nov 2020 05:35) (18 - 20)  SpO2: 92% (24 Nov 2020 05:35) (92% - 94%)        11-23 @ 07:01  -  11-24 @ 06:16  --------------------------------------------------------  IN: 240 mL / OUT: 0 mL / NET: 240 mL      CAPILLARY BLOOD GLUCOSE          PHYSICAL EXAM: NAD if still  General: Awake, alert, oriented X 3.   HEENT: Atraumatic, normocephalic.                 Mallampatti Grade 3                No nasal congestion.                No tonsillar or pharyngeal exudates.  Lymph Nodes: No palpable lymphadenopathy  Neck: No JVD. No carotid bruit.   Respiratory: Normal chest expansion                         Normal percussion                         Normal and equal air entry                         No wheeze, rhonchi but bilateral rales.  Cardiovascular: S1 S2 normal. No murmurs, rubs or gallops.   Abdomen: Soft, non-tender, non-distended. No organomegaly. Normoactive bowel sounds.  Extremities: Warm to touch. Peripheral pulse palpable. No pedal edema.   Skin: No rashes or skin lesions  Neurological: Motor and sensory examination equal and normal in all four extremities.  Psychiatry: Appropriate mood and affect.    HOSPITAL MEDICATIONS:  MEDICATIONS  (STANDING):  ascorbic acid 500 milliGRAM(s) Oral daily  aspirin enteric coated 81 milliGRAM(s) Oral daily  budesonide 160 MICROgram(s)/formoterol 4.5 MICROgram(s) Inhaler 2 Puff(s) Inhalation two times a day  cholecalciferol 2000 Unit(s) Oral daily  dexAMETHasone  Injectable 6 milliGRAM(s) IV Push daily  enoxaparin Injectable 40 milliGRAM(s) SubCutaneous every 12 hours  zinc sulfate 220 milliGRAM(s) Oral daily    MEDICATIONS  (PRN):  acetaminophen   Tablet .. 650 milliGRAM(s) Oral every 4 hours PRN Temp greater or equal to 38.5C (101.3F)  ALBUTerol    90 MICROgram(s) HFA Inhaler 2 Puff(s) Inhalation every 6 hours PRN Shortness of Breath and/or Wheezing      LABS:                        14.2   8.69  )-----------( 177      ( 23 Nov 2020 22:54 )             43.4     11-23    137  |  103  |  22  ----------------------------<  275<H>  4.2   |  21<L>  |  1.03    Ca    8.9      23 Nov 2020 22:54    TPro  6.6  /  Alb  3.4  /  TBili  0.6  /  DBili  x   /  AST  38  /  ALT  84<H>  /  AlkPhos  77  11-23    PT/INR - ( 23 Nov 2020 15:48 )   PT: 14.7 sec;   INR: 1.24 ratio         PTT - ( 23 Nov 2020 15:48 )  PTT:30.7 sec      Venous Blood Gas:  11-23 @ 18:27  7.46/35/60/24/91  VBG Lactate: 1.4      MICROBIOLOGY:     RADIOLOGY: rad  [ ] Reviewed and interpreted by me    Point of Care Ultrasound Findings:    PFT:    EKG:

## 2020-11-24 NOTE — PROGRESS NOTE ADULT - SUBJECTIVE AND OBJECTIVE BOX
Saturating 84% on RA after walking around in his room  Saturating 92% on 3LNCO2 afterwards  He says his breathing is feeling a little better than last night    Vital Signs Last 24 Hrs  T(C): 37.3 (24 Nov 2020 08:27), Max: 39.5 (23 Nov 2020 14:13)  T(F): 99.1 (24 Nov 2020 08:27), Max: 103.1 (23 Nov 2020 14:13)  HR: 72 (24 Nov 2020 08:27) (70 - 110)  BP: 159/84 (24 Nov 2020 08:27) (141/87 - 163/92)  BP(mean): --  RR: 23 (24 Nov 2020 08:30) (18 - 23)  SpO2: 92% (24 Nov 2020 08:30) (84% - 94%)    I&O's Summary    11-23-20 @ 07:01  -  11-24-20 @ 07:00  --------------------------------------------------------  IN: 240 mL / OUT: 0 mL / NET: 240 mL        GENERAL: Alert. Not confused. No acute distress. Not thin. Not cachectic. Not obese.  HEAD:  Atraumatic. Normocephalic.  EYES: EOMI. PERRLA. Normal conjunctiva/sclera.  ENT: Neck supple. No JVD. Moist oral mucosa. Not edentulous. No thrush.  LYMPH: Normal supraclavicular/cervical lymph nodes.   CARDIAC: No tachy, Not sandhya. Regular rhythm. Not irregularly irregular. S1. S2. No murmur. No rub. No distant heart sounds.  LUNG/CHEST: CTAB. No increased work of breathing. BS equal bilaterally. No wheezes. No rales. No rhonchi.  ABDOMEN: Soft. No tenderness. No distension. No fluid wave. Normal bowel sounds.  BACK: No midline/vertebral tenderness. No flank tenderness.  VASCULAR: +2 b/l radial or ulnar pulses. Palpable DP pulses.  EXTREMITIES:  No clubbing. No cyanosis. No edema. Moving all 4.  NEUROLOGY: A&Ox3. Non-focal exam. Cranial nerves intact. Normal speech. Sensation intact.  PSYCH: Normal behavior. Normal affect.  SKIN: No jaundice. No erythema. No rash/lesion.    LABS:                        14.2   8.69  )-----------( 177      ( 23 Nov 2020 22:54 )             43.4     11-23    137  |  103  |  22  ----------------------------<  275<H>  4.2   |  21<L>  |  1.03    Ca    8.9      23 Nov 2020 22:54    TPro  6.6  /  Alb  3.4  /  TBili  0.6  /  DBili  x   /  AST  38  /  ALT  84<H>  /  AlkPhos  77  11-23    PT/INR - ( 23 Nov 2020 15:48 )   PT: 14.7 sec;   INR: 1.24 ratio         PTT - ( 23 Nov 2020 15:48 )  PTT:30.7 sec  CAPILLARY BLOOD GLUCOSE                RADIOLOGY & ADDITIONAL TESTS:    Imaging Personally Reviewed:  [x] YES  [ ] NO    Consultant(s) Notes Reviewed:  [x] YES  [ ] NO

## 2020-11-25 LAB
ALBUMIN SERPL ELPH-MCNC: 3.4 G/DL — SIGNIFICANT CHANGE UP (ref 3.3–5)
ALBUMIN SERPL ELPH-MCNC: 3.4 G/DL — SIGNIFICANT CHANGE UP (ref 3.3–5)
ALP SERPL-CCNC: 70 U/L — SIGNIFICANT CHANGE UP (ref 40–120)
ALP SERPL-CCNC: 73 U/L — SIGNIFICANT CHANGE UP (ref 40–120)
ALT FLD-CCNC: 55 U/L — HIGH (ref 10–45)
ALT FLD-CCNC: 56 U/L — HIGH (ref 10–45)
ANION GAP SERPL CALC-SCNC: 14 MMOL/L — SIGNIFICANT CHANGE UP (ref 5–17)
AST SERPL-CCNC: 16 U/L — SIGNIFICANT CHANGE UP (ref 10–40)
AST SERPL-CCNC: 17 U/L — SIGNIFICANT CHANGE UP (ref 10–40)
BILIRUB DIRECT SERPL-MCNC: 0.1 MG/DL — SIGNIFICANT CHANGE UP (ref 0–0.2)
BILIRUB INDIRECT FLD-MCNC: 0.5 MG/DL — SIGNIFICANT CHANGE UP (ref 0.2–1)
BILIRUB SERPL-MCNC: 0.6 MG/DL — SIGNIFICANT CHANGE UP (ref 0.2–1.2)
BILIRUB SERPL-MCNC: 0.6 MG/DL — SIGNIFICANT CHANGE UP (ref 0.2–1.2)
BUN SERPL-MCNC: 27 MG/DL — HIGH (ref 7–23)
CALCIUM SERPL-MCNC: 9.2 MG/DL — SIGNIFICANT CHANGE UP (ref 8.4–10.5)
CHLORIDE SERPL-SCNC: 105 MMOL/L — SIGNIFICANT CHANGE UP (ref 96–108)
CO2 SERPL-SCNC: 22 MMOL/L — SIGNIFICANT CHANGE UP (ref 22–31)
CREAT SERPL-MCNC: 0.96 MG/DL — SIGNIFICANT CHANGE UP (ref 0.5–1.3)
CREAT SERPL-MCNC: 0.97 MG/DL — SIGNIFICANT CHANGE UP (ref 0.5–1.3)
D DIMER BLD IA.RAPID-MCNC: 195 NG/ML DDU — SIGNIFICANT CHANGE UP
GLUCOSE SERPL-MCNC: 148 MG/DL — HIGH (ref 70–99)
HCT VFR BLD CALC: 44 % — SIGNIFICANT CHANGE UP (ref 39–50)
HGB BLD-MCNC: 14.6 G/DL — SIGNIFICANT CHANGE UP (ref 13–17)
MCHC RBC-ENTMCNC: 29.2 PG — SIGNIFICANT CHANGE UP (ref 27–34)
MCHC RBC-ENTMCNC: 33.2 GM/DL — SIGNIFICANT CHANGE UP (ref 32–36)
MCV RBC AUTO: 88 FL — SIGNIFICANT CHANGE UP (ref 80–100)
NRBC # BLD: 0 /100 WBCS — SIGNIFICANT CHANGE UP (ref 0–0)
PLATELET # BLD AUTO: 230 K/UL — SIGNIFICANT CHANGE UP (ref 150–400)
POTASSIUM SERPL-MCNC: 4.2 MMOL/L — SIGNIFICANT CHANGE UP (ref 3.5–5.3)
POTASSIUM SERPL-SCNC: 4.2 MMOL/L — SIGNIFICANT CHANGE UP (ref 3.5–5.3)
PROT SERPL-MCNC: 6.7 G/DL — SIGNIFICANT CHANGE UP (ref 6–8.3)
PROT SERPL-MCNC: 6.8 G/DL — SIGNIFICANT CHANGE UP (ref 6–8.3)
RBC # BLD: 5 M/UL — SIGNIFICANT CHANGE UP (ref 4.2–5.8)
RBC # FLD: 12.6 % — SIGNIFICANT CHANGE UP (ref 10.3–14.5)
SODIUM SERPL-SCNC: 141 MMOL/L — SIGNIFICANT CHANGE UP (ref 135–145)
WBC # BLD: 8.93 K/UL — SIGNIFICANT CHANGE UP (ref 3.8–10.5)
WBC # FLD AUTO: 8.93 K/UL — SIGNIFICANT CHANGE UP (ref 3.8–10.5)

## 2020-11-25 PROCEDURE — 99232 SBSQ HOSP IP/OBS MODERATE 35: CPT

## 2020-11-25 RX ORDER — INFLUENZA VIRUS VACCINE 15; 15; 15; 15 UG/.5ML; UG/.5ML; UG/.5ML; UG/.5ML
0.5 SUSPENSION INTRAMUSCULAR ONCE
Refills: 0 | Status: DISCONTINUED | OUTPATIENT
Start: 2020-11-25 | End: 2020-11-28

## 2020-11-25 RX ADMIN — Medication 81 MILLIGRAM(S): at 11:14

## 2020-11-25 RX ADMIN — Medication 500 MILLIGRAM(S): at 11:14

## 2020-11-25 RX ADMIN — ZINC SULFATE TAB 220 MG (50 MG ZINC EQUIVALENT) 220 MILLIGRAM(S): 220 (50 ZN) TAB at 11:14

## 2020-11-25 RX ADMIN — MONTELUKAST 10 MILLIGRAM(S): 4 TABLET, CHEWABLE ORAL at 11:14

## 2020-11-25 RX ADMIN — BUDESONIDE AND FORMOTEROL FUMARATE DIHYDRATE 2 PUFF(S): 160; 4.5 AEROSOL RESPIRATORY (INHALATION) at 17:19

## 2020-11-25 RX ADMIN — ENOXAPARIN SODIUM 40 MILLIGRAM(S): 100 INJECTION SUBCUTANEOUS at 17:18

## 2020-11-25 RX ADMIN — Medication 2000 UNIT(S): at 11:14

## 2020-11-25 RX ADMIN — Medication 6 MILLIGRAM(S): at 05:08

## 2020-11-25 RX ADMIN — ENOXAPARIN SODIUM 40 MILLIGRAM(S): 100 INJECTION SUBCUTANEOUS at 05:08

## 2020-11-25 RX ADMIN — BUDESONIDE AND FORMOTEROL FUMARATE DIHYDRATE 2 PUFF(S): 160; 4.5 AEROSOL RESPIRATORY (INHALATION) at 05:07

## 2020-11-25 RX ADMIN — REMDESIVIR 500 MILLIGRAM(S): 5 INJECTION INTRAVENOUS at 11:13

## 2020-11-25 NOTE — PROGRESS NOTE ADULT - PROBLEM SELECTOR PLAN 2
positive outpt covid pcr on 11/13; w/ sx as above; mild D dimer elevation; CXR w/ patchy opacities; currently maintaining O2 sat on RA.  still COVID-19 PCR (+) here and COVID-19 Ab is (+) as well  initial inflammatory labs: D dimer- 300; Fibrinogen 1148; CRP 16.54; Procal 0.21  started on pred 30 mg (equiv to dexameth 4.5 mg) on 11/19 by pulm  he received decadron 10 mg IVP x 1 on 11/23 and started 6 mg IVP daily on 11/24  he started remdesivir on 11/24/20 x 5 days  duonebs q6h ATC in lieu of his handheld inhalers  incentive spirometer  maintain O2 sats above 92%  cont with enoxaparin bid  inflammatory markers q3days  appreciate pulmonology

## 2020-11-25 NOTE — PROGRESS NOTE ADULT - SUBJECTIVE AND OBJECTIVE BOX
Patient is a 60y old  Male who presents with a chief complaint of Covid 19 (25 Nov 2020 05:09)      SUBJECTIVE / OVERNIGHT EVENTS:  Pt seen and examined at bedside.   No overnight event.  Feeling better overall. able to wean down to room air O2 today  still a little out of breath on exertion   no cp, no sob, no n/v/d.       Vital Signs Last 24 Hrs  T(C): 36.7 (25 Nov 2020 17:13), Max: 37.2 (24 Nov 2020 21:00)  T(F): 98 (25 Nov 2020 17:13), Max: 98.9 (24 Nov 2020 21:00)  HR: 76 (25 Nov 2020 17:13) (60 - 79)  BP: 146/89 (25 Nov 2020 17:13) (133/97 - 150/91)  BP(mean): --  RR: 18 (25 Nov 2020 17:13) (18 - 20)  SpO2: 92% (25 Nov 2020 17:13) (91% - 95%)  I&O's Summary      PHYSICAL EXAM:  GENERAL: NAD, Comfortable  HEAD:  Atraumatic, Normocephalic  EYES: EOMI, PERRLA, conjunctiva and sclera clear  NECK: Supple, No JVD  CHEST/LUNG: mild decrease breath sounds bilaterally; No wheeze   HEART: Regular rate and rhythm; No murmurs, rubs, or gallops  ABDOMEN: Soft, Nontender, Nondistended; Bowel sounds present  Neuro: AAO x 3, no focal deficit, 5/5 b/l extremities  EXTREMITIES:  2+ Peripheral Pulses, No clubbing, cyanosis, or edema  SKIN: No rashes or lesions    LABS:                        14.6   8.93  )-----------( 230      ( 25 Nov 2020 06:50 )             44.0     11-25    141  |  105  |  27<H>  ----------------------------<  148<H>  4.2   |  22  |  0.96    Ca    9.2      25 Nov 2020 06:50    TPro  6.8  /  Alb  3.4  /  TBili  0.6  /  DBili  0.1  /  AST  17  /  ALT  56<H>  /  AlkPhos  73  11-25      CAPILLARY BLOOD GLUCOSE                RADIOLOGY & ADDITIONAL TESTS:    Imaging Personally Reviewed:  [x] YES  [ ] NO    Consultant(s) Notes Reviewed:  [x] YES  [ ] NO      MEDICATIONS  (STANDING):  ALBUTerol    90 MICROgram(s) HFA Inhaler 1 Puff(s) Inhalation every 6 hours  ascorbic acid 500 milliGRAM(s) Oral daily  aspirin enteric coated 81 milliGRAM(s) Oral daily  budesonide 160 MICROgram(s)/formoterol 4.5 MICROgram(s) Inhaler 2 Puff(s) Inhalation two times a day  cholecalciferol 2000 Unit(s) Oral daily  dexAMETHasone  Injectable 6 milliGRAM(s) IV Push daily  enoxaparin Injectable 40 milliGRAM(s) SubCutaneous every 12 hours  influenza   Vaccine 0.5 milliLiter(s) IntraMuscular once  montelukast 10 milliGRAM(s) Oral daily  remdesivir  IVPB   IV Intermittent   remdesivir  IVPB 100 milliGRAM(s) IV Intermittent every 24 hours  tiotropium 18 MICROgram(s) Capsule 1 Capsule(s) Inhalation daily  zinc sulfate 220 milliGRAM(s) Oral daily    MEDICATIONS  (PRN):  acetaminophen   Tablet .. 650 milliGRAM(s) Oral every 4 hours PRN Temp greater or equal to 38.5C (101.3F)      Care Discussed with Consultants/Other Providers [x] YES  [ ] NO

## 2020-11-25 NOTE — PROGRESS NOTE ADULT - PROBLEM SELECTOR PLAN 1
2' COVID-19; recent outpt diagnosis on 11/13; conversationally dyspneic w/ dec 02 saturation to low 90s on RA  still COVID-19 PCR (+) here and COVID-19 Ab is (+) as well  he received decadron 10 mg IVP x 1 on 11/23 and started 6 mg IVP daily on 11/24  he started remdesivir on 11/24/20 x 5 days  duonebs q6h ATC in lieu of his handheld inhalers  incentive spirometer  maintain O2 sats above 92%, continue to wean down O2 as tolerates  rest as per covid management below

## 2020-11-25 NOTE — PROGRESS NOTE ADULT - ASSESSMENT
59 y/o M w/hx of lung nodule s/p resection, asthma, and SUZY presenting with COVID-19 infection. Patient saturating ok on room air in low-mid 90s. D. Dimer mildly elevated. Hyperkalemia likely secondary to hemolyzed sample. Chest pain likely pleurisy.  *******************  11/24-remains hypoxemic w/exertion 59 y/o M w/hx of lung nodule s/p resection, asthma, and SUZY presenting with COVID-19 infection. Patient saturating ok on room air in low-mid 90s. D. Dimer mildly elevated. Hyperkalemia likely secondary to hemolyzed sample. Chest pain likely pleurisy.  *******************  11/24-remains hypoxemic w/exertion  11/25-s/p RX 1 remdisivir, slow improvements

## 2020-11-25 NOTE — PROGRESS NOTE ADULT - SUBJECTIVE AND OBJECTIVE BOX
CHIEF COMPLAINT:  f/up covid 19 pneumonitis, asthma, allergy, gerd, osas--sob on exertion and cough present    Interval Events:    REVIEW OF SYSTEMS:  Constitutional: No fevers or chills. No weight loss. No fatigue or generalized malaise.  Eyes: No itching or discharge from the eyes  ENT: No ear pain. No ear discharge. No nasal congestion. No post nasal drip. No epistaxis. No throat pain. No sore throat. No difficulty swallowing.   CV: No chest pain. No palpitations. No lightheadedness or dizziness.   Resp: No dyspnea at rest. No dyspnea on exertion. No orthopnea. No wheezing. No cough. No stridor. No sputum production. No chest pain with respiration.  GI: No nausea. No vomiting. No diarrhea.  MSK: No joint pain or pain in any extremities  Integumentary: No skin lesions. No pedal edema.  Neurological: No gross motor weakness. No sensory changes.  [ ] All other systems negative  [ ] Unable to assess ROS because ________    OBJECTIVE:  ICU Vital Signs Last 24 Hrs  T(C): 37.2 (24 Nov 2020 21:00), Max: 37.7 (24 Nov 2020 17:02)  T(F): 98.9 (24 Nov 2020 21:00), Max: 99.8 (24 Nov 2020 17:02)  HR: 75 (24 Nov 2020 21:00) (70 - 81)  BP: 150/91 (24 Nov 2020 21:00) (150/91 - 160/90)  BP(mean): --  ABP: --  ABP(mean): --  RR: 20 (24 Nov 2020 21:00) (20 - 24)  SpO2: 94% (24 Nov 2020 21:00) (84% - 94%)        11-23 @ 07:01  -  11-24 @ 07:00  --------------------------------------------------------  IN: 240 mL / OUT: 0 mL / NET: 240 mL      CAPILLARY BLOOD GLUCOSE          PHYSICAL EXAM:  General: Awake, alert, oriented X 3.   HEENT: Atraumatic, normocephalic.                 Mallampatti Grade                 No nasal congestion.                No tonsillar or pharyngeal exudates.  Lymph Nodes: No palpable lymphadenopathy  Neck: No JVD. No carotid bruit.   Respiratory: Normal chest expansion                         Normal percussion                         Normal and equal air entry                         No wheeze, rhonchi or rales.  Cardiovascular: S1 S2 normal. No murmurs, rubs or gallops.   Abdomen: Soft, non-tender, non-distended. No organomegaly. Normoactive bowel sounds.  Extremities: Warm to touch. Peripheral pulse palpable. No pedal edema.   Skin: No rashes or skin lesions  Neurological: Motor and sensory examination equal and normal in all four extremities.  Psychiatry: Appropriate mood and affect.    HOSPITAL MEDICATIONS:  MEDICATIONS  (STANDING):  ALBUTerol    90 MICROgram(s) HFA Inhaler 1 Puff(s) Inhalation every 6 hours  ascorbic acid 500 milliGRAM(s) Oral daily  aspirin enteric coated 81 milliGRAM(s) Oral daily  budesonide 160 MICROgram(s)/formoterol 4.5 MICROgram(s) Inhaler 2 Puff(s) Inhalation two times a day  cholecalciferol 2000 Unit(s) Oral daily  dexAMETHasone  Injectable 6 milliGRAM(s) IV Push daily  enoxaparin Injectable 40 milliGRAM(s) SubCutaneous every 12 hours  montelukast 10 milliGRAM(s) Oral daily  remdesivir  IVPB   IV Intermittent   remdesivir  IVPB 100 milliGRAM(s) IV Intermittent every 24 hours  tiotropium 18 MICROgram(s) Capsule 1 Capsule(s) Inhalation daily  zinc sulfate 220 milliGRAM(s) Oral daily    MEDICATIONS  (PRN):  acetaminophen   Tablet .. 650 milliGRAM(s) Oral every 4 hours PRN Temp greater or equal to 38.5C (101.3F)      LABS:                        14.2   8.69  )-----------( 177      ( 23 Nov 2020 22:54 )             43.4     11-24    x   |  x   |  x   ----------------------------<  x   x    |  x   |  0.90    Ca    8.9      23 Nov 2020 22:54    TPro  6.8  /  Alb  3.4  /  TBili  0.5  /  DBili  0.1  /  AST  28  /  ALT  71<H>  /  AlkPhos  77  11-24    PT/INR - ( 23 Nov 2020 15:48 )   PT: 14.7 sec;   INR: 1.24 ratio         PTT - ( 23 Nov 2020 15:48 )  PTT:30.7 sec      Venous Blood Gas:  11-23 @ 18:27  7.46/35/60/24/91  VBG Lactate: 1.4      MICROBIOLOGY:     RADIOLOGY:  [ ] Reviewed and interpreted by me    Point of Care Ultrasound Findings:    PFT:    EKG: CHIEF COMPLAINT:  f/up covid 19 pneumonitis, asthma, allergy, gerd, osas--still chest pain and sob on exertion and cough present but better    Interval Events: remdisivir D1/5    REVIEW OF SYSTEMS:  Constitutional: No fevers or chills. No weight loss. No fatigue or generalized malaise.  Eyes: No itching or discharge from the eyes  ENT: No ear pain. No ear discharge. No nasal congestion. No post nasal drip. No epistaxis. No throat pain. No sore throat. No difficulty swallowing.   CV: No chest pain. No palpitations. No lightheadedness or dizziness.   Resp: No dyspnea at rest. + dyspnea on exertion. No orthopnea. No wheezing. No cough. No stridor. No sputum production. + chest pain with respiration.  GI: No nausea. No vomiting. No diarrhea.  MSK: No joint pain or pain in any extremities  Integumentary: No skin lesions. No pedal edema.  Neurological: No gross motor weakness. No sensory changes.  [+ ] All other systems negative  [ ] Unable to assess ROS because ________    OBJECTIVE:  ICU Vital Signs Last 24 Hrs  T(C): 37.2 (24 Nov 2020 21:00), Max: 37.7 (24 Nov 2020 17:02)  T(F): 98.9 (24 Nov 2020 21:00), Max: 99.8 (24 Nov 2020 17:02)  HR: 75 (24 Nov 2020 21:00) (70 - 81)  BP: 150/91 (24 Nov 2020 21:00) (150/91 - 160/90)  BP(mean): --  ABP: --  ABP(mean): --  RR: 20 (24 Nov 2020 21:00) (20 - 24)  SpO2: 94% (24 Nov 2020 21:00) (84% - 94%)        11-23 @ 07:01  -  11-24 @ 07:00  --------------------------------------------------------  IN: 240 mL / OUT: 0 mL / NET: 240 mL      CAPILLARY BLOOD GLUCOSE          PHYSICAL EXAM: NAD on NC 3 l  General: Awake, alert, oriented X 3.   HEENT: Atraumatic, normocephalic.                 Mallampatti Grade 3                No nasal congestion.                No tonsillar or pharyngeal exudates.  Lymph Nodes: No palpable lymphadenopathy  Neck: No JVD. No carotid bruit.   Respiratory: Normal chest expansion                         Normal percussion                         Normal and equal air entry                         No wheeze, rhonchi but mild inspiratory rales.  Cardiovascular: S1 S2 normal. No murmurs, rubs or gallops.   Abdomen: Soft, non-tender, non-distended. No organomegaly. Normoactive bowel sounds.  Extremities: Warm to touch. Peripheral pulse palpable. No pedal edema.   Skin: No rashes or skin lesions  Neurological: Motor and sensory examination equal and normal in all four extremities.  Psychiatry: Appropriate mood and affect.    HOSPITAL MEDICATIONS:  MEDICATIONS  (STANDING):  ALBUTerol    90 MICROgram(s) HFA Inhaler 1 Puff(s) Inhalation every 6 hours  ascorbic acid 500 milliGRAM(s) Oral daily  aspirin enteric coated 81 milliGRAM(s) Oral daily  budesonide 160 MICROgram(s)/formoterol 4.5 MICROgram(s) Inhaler 2 Puff(s) Inhalation two times a day  cholecalciferol 2000 Unit(s) Oral daily  dexAMETHasone  Injectable 6 milliGRAM(s) IV Push daily  enoxaparin Injectable 40 milliGRAM(s) SubCutaneous every 12 hours  montelukast 10 milliGRAM(s) Oral daily  remdesivir  IVPB   IV Intermittent   remdesivir  IVPB 100 milliGRAM(s) IV Intermittent every 24 hours  tiotropium 18 MICROgram(s) Capsule 1 Capsule(s) Inhalation daily  zinc sulfate 220 milliGRAM(s) Oral daily    MEDICATIONS  (PRN):  acetaminophen   Tablet .. 650 milliGRAM(s) Oral every 4 hours PRN Temp greater or equal to 38.5C (101.3F)      LABS:                        14.2   8.69  )-----------( 177      ( 23 Nov 2020 22:54 )             43.4     11-24    x   |  x   |  x   ----------------------------<  x   x    |  x   |  0.90    Ca    8.9      23 Nov 2020 22:54    TPro  6.8  /  Alb  3.4  /  TBili  0.5  /  DBili  0.1  /  AST  28  /  ALT  71<H>  /  AlkPhos  77  11-24    PT/INR - ( 23 Nov 2020 15:48 )   PT: 14.7 sec;   INR: 1.24 ratio         PTT - ( 23 Nov 2020 15:48 )  PTT:30.7 sec      Venous Blood Gas:  11-23 @ 18:27  7.46/35/60/24/91  VBG Lactate: 1.4      MICROBIOLOGY:     RADIOLOGY:  [ ] Reviewed and interpreted by me    Point of Care Ultrasound Findings:    PFT:    EKG:

## 2020-11-25 NOTE — PROGRESS NOTE ADULT - ATTENDING COMMENTS
as above:  multifactorial dyspnea-resp failure--covid 19 pneumonitis, asthma, obesity, debility--O2 -keep sat above 90%  covid 19 pneumonitis--would initiate remdisivir and dexameth protocol (pt is worse than admitting), zn, vit d/c, melatonin, tylenol  asthma-symbicort, spiriva, singulair          Allergy-claritin/flonase  osas-out pt DD  DVT prophylaxis-enoxaparin   GI-pepcid 20 bid      OOB as able    BP control  Garrett Wood MD-Pulmonary   [phone] as above: improving  multifactorial dyspnea-resp failure--covid 19 pneumonitis, asthma, obesity, debility--O2 -keep sat above 90%  covid 19 pneumonitis--D2/5 remdisivir and dexameth protocol , zn, vit d/c, melatonin, tylenol  asthma-symbicort, spiriva, singulair          Allergy-claritin/flonase  osas-out pt DD  DVT prophylaxis-enoxaparin   GI-pepcid 20 bid      OOB as able    BP control   DC planning -into weekend  Garrett Wood MD-Pulmonary   [phone]

## 2020-11-26 LAB
ALBUMIN SERPL ELPH-MCNC: 3.5 G/DL — SIGNIFICANT CHANGE UP (ref 3.3–5)
ALP SERPL-CCNC: 67 U/L — SIGNIFICANT CHANGE UP (ref 40–120)
ALT FLD-CCNC: 44 U/L — SIGNIFICANT CHANGE UP (ref 10–45)
ANION GAP SERPL CALC-SCNC: 13 MMOL/L — SIGNIFICANT CHANGE UP (ref 5–17)
AST SERPL-CCNC: 16 U/L — SIGNIFICANT CHANGE UP (ref 10–40)
BILIRUB SERPL-MCNC: 0.4 MG/DL — SIGNIFICANT CHANGE UP (ref 0.2–1.2)
BUN SERPL-MCNC: 27 MG/DL — HIGH (ref 7–23)
CALCIUM SERPL-MCNC: 9.2 MG/DL — SIGNIFICANT CHANGE UP (ref 8.4–10.5)
CHLORIDE SERPL-SCNC: 105 MMOL/L — SIGNIFICANT CHANGE UP (ref 96–108)
CO2 SERPL-SCNC: 22 MMOL/L — SIGNIFICANT CHANGE UP (ref 22–31)
CREAT SERPL-MCNC: 0.9 MG/DL — SIGNIFICANT CHANGE UP (ref 0.5–1.3)
FERRITIN SERPL-MCNC: 1473 NG/ML — HIGH (ref 30–400)
GLUCOSE SERPL-MCNC: 157 MG/DL — HIGH (ref 70–99)
HCT VFR BLD CALC: 44.7 % — SIGNIFICANT CHANGE UP (ref 39–50)
HGB BLD-MCNC: 14.7 G/DL — SIGNIFICANT CHANGE UP (ref 13–17)
MCHC RBC-ENTMCNC: 29 PG — SIGNIFICANT CHANGE UP (ref 27–34)
MCHC RBC-ENTMCNC: 32.9 GM/DL — SIGNIFICANT CHANGE UP (ref 32–36)
MCV RBC AUTO: 88.2 FL — SIGNIFICANT CHANGE UP (ref 80–100)
NRBC # BLD: 0 /100 WBCS — SIGNIFICANT CHANGE UP (ref 0–0)
PLATELET # BLD AUTO: 252 K/UL — SIGNIFICANT CHANGE UP (ref 150–400)
POTASSIUM SERPL-MCNC: 4.1 MMOL/L — SIGNIFICANT CHANGE UP (ref 3.5–5.3)
POTASSIUM SERPL-SCNC: 4.1 MMOL/L — SIGNIFICANT CHANGE UP (ref 3.5–5.3)
PROT SERPL-MCNC: 6.5 G/DL — SIGNIFICANT CHANGE UP (ref 6–8.3)
RBC # BLD: 5.07 M/UL — SIGNIFICANT CHANGE UP (ref 4.2–5.8)
RBC # FLD: 12.6 % — SIGNIFICANT CHANGE UP (ref 10.3–14.5)
SODIUM SERPL-SCNC: 140 MMOL/L — SIGNIFICANT CHANGE UP (ref 135–145)
WBC # BLD: 5.97 K/UL — SIGNIFICANT CHANGE UP (ref 3.8–10.5)
WBC # FLD AUTO: 5.97 K/UL — SIGNIFICANT CHANGE UP (ref 3.8–10.5)

## 2020-11-26 RX ADMIN — Medication 2000 UNIT(S): at 11:19

## 2020-11-26 RX ADMIN — ENOXAPARIN SODIUM 40 MILLIGRAM(S): 100 INJECTION SUBCUTANEOUS at 05:18

## 2020-11-26 RX ADMIN — REMDESIVIR 500 MILLIGRAM(S): 5 INJECTION INTRAVENOUS at 11:19

## 2020-11-26 RX ADMIN — Medication 500 MILLIGRAM(S): at 11:19

## 2020-11-26 RX ADMIN — BUDESONIDE AND FORMOTEROL FUMARATE DIHYDRATE 2 PUFF(S): 160; 4.5 AEROSOL RESPIRATORY (INHALATION) at 05:19

## 2020-11-26 RX ADMIN — Medication 6 MILLIGRAM(S): at 05:18

## 2020-11-26 RX ADMIN — MONTELUKAST 10 MILLIGRAM(S): 4 TABLET, CHEWABLE ORAL at 11:19

## 2020-11-26 RX ADMIN — ENOXAPARIN SODIUM 40 MILLIGRAM(S): 100 INJECTION SUBCUTANEOUS at 18:38

## 2020-11-26 RX ADMIN — Medication 81 MILLIGRAM(S): at 11:19

## 2020-11-26 RX ADMIN — ZINC SULFATE TAB 220 MG (50 MG ZINC EQUIVALENT) 220 MILLIGRAM(S): 220 (50 ZN) TAB at 12:26

## 2020-11-26 RX ADMIN — BUDESONIDE AND FORMOTEROL FUMARATE DIHYDRATE 2 PUFF(S): 160; 4.5 AEROSOL RESPIRATORY (INHALATION) at 18:39

## 2020-11-26 NOTE — PROGRESS NOTE ADULT - ASSESSMENT
60 yr old male w/ PMH of "elevated cholesterol" (not on medications), lung nodules s/p resection (from 9/11, not mesothelioma), asthma, and SUZY with recent diagnosis of COVID (+ pcr on 11/13) presents with shortness of breath and fevers.

## 2020-11-26 NOTE — PROGRESS NOTE ADULT - ATTENDING COMMENTS
Dc planning after 5 days of Remdesivir, tentatively Saturaday morning, if his oxygenation is stable.     - Dr. JOHNATHAN Vargaset ([facility])  - [phone]

## 2020-11-26 NOTE — PROGRESS NOTE ADULT - PROBLEM SELECTOR PLAN 2
Positive outpt covid pcr on 11/13; w/ sx as above; mild D dimer elevation; CXR w/ patchy opacities; currently maintaining O2 sat on RA.  still COVID-19 PCR (+) here and COVID-19 Ab is (+) as well  initial inflammatory labs: D dimer- 300; Fibrinogen 1148; CRP 16.54; Procal 0.21  started on pred 30 mg (equiv to dexameth 4.5 mg) on 11/19 by pulm  c/w Remdesivir and Decadron as above.  duonebs q6h ATC in lieu of his handheld inhalers  incentive spirometer  maintain O2 sats above 92%  cont with enoxaparin bid  inflammatory markers q3days  appreciate pulmonology

## 2020-11-26 NOTE — PROGRESS NOTE ADULT - PROBLEM SELECTOR PLAN 1
2' COVID-19; recent outpt diagnosis on 11/13; conversationally dyspneic w/ dec 02 saturation to low 90s on RA  still COVID-19 PCR (+) here and COVID-19 Ab is (+) as well  he received decadron 10 mg IVP x 1 on 11/23 and started 6 mg IVP daily on 11/24  he started remdesivir on 11/24/20 with plan for total 5 days  duonebs q6h ATC in lieu of his handheld inhalers  incentive spirometer  maintain O2 sats above 92%, continue to wean down O2 as tolerates  rest as per covid management below

## 2020-11-26 NOTE — PROGRESS NOTE ADULT - SUBJECTIVE AND OBJECTIVE BOX
Patient is a 60y old  Male who presents with a chief complaint of Covid 19 (25 Nov 2020 18:20)      SUBJECTIVE / OVERNIGHT EVENTS:  Feeling better today.  No overnight event.  No complaints.  Chest pain free. no SOB, no N/V/D.  Denied HA/dizziness, abdominal pain.   remain stable on room air        Vital Signs Last 24 Hrs  T(C): 36.7 (26 Nov 2020 08:47), Max: 36.7 (25 Nov 2020 17:13)  T(F): 98 (26 Nov 2020 08:47), Max: 98 (25 Nov 2020 17:13)  HR: 59 (26 Nov 2020 08:47) (59 - 76)  BP: 167/90 (26 Nov 2020 08:47) (140/78 - 167/90)  BP(mean): --  RR: 18 (26 Nov 2020 08:47) (18 - 18)  SpO2: 92% (26 Nov 2020 08:47) (91% - 93%)  I&O's Summary    25 Nov 2020 07:01  -  26 Nov 2020 07:00  --------------------------------------------------------  IN: 480 mL / OUT: 0 mL / NET: 480 mL        PHYSICAL EXAM:  GENERAL: NAD, Comfortable  HEAD:  Atraumatic, Normocephalic  EYES: EOMI, PERRLA, conjunctiva and sclera clear  NECK: Supple, No JVD  CHEST/LUNG: mild decrease breath sounds bilaterally; No wheeze   HEART: Regular rate and rhythm; No murmurs, rubs, or gallops  ABDOMEN: Soft, Nontender, Nondistended; Bowel sounds present  Neuro: AAO x 3, no focal deficit, 5/5 b/l extremities  EXTREMITIES:  2+ Peripheral Pulses, No clubbing, cyanosis, or edema  SKIN: No rashes or lesion      LABS:                        14.7   5.97  )-----------( 252      ( 26 Nov 2020 06:35 )             44.7     11-26    140  |  105  |  27<H>  ----------------------------<  157<H>  4.1   |  22  |  0.90    Ca    9.2      26 Nov 2020 06:35    TPro  6.5  /  Alb  3.5  /  TBili  0.4  /  DBili  x   /  AST  16  /  ALT  44  /  AlkPhos  67  11-26      CAPILLARY BLOOD GLUCOSE                RADIOLOGY & ADDITIONAL TESTS:    Imaging Personally Reviewed:  [x] YES  [ ] NO    Consultant(s) Notes Reviewed:  [x] YES  [ ] NO      MEDICATIONS  (STANDING):  ALBUTerol    90 MICROgram(s) HFA Inhaler 1 Puff(s) Inhalation every 6 hours  ascorbic acid 500 milliGRAM(s) Oral daily  aspirin enteric coated 81 milliGRAM(s) Oral daily  budesonide 160 MICROgram(s)/formoterol 4.5 MICROgram(s) Inhaler 2 Puff(s) Inhalation two times a day  cholecalciferol 2000 Unit(s) Oral daily  dexAMETHasone  Injectable 6 milliGRAM(s) IV Push daily  enoxaparin Injectable 40 milliGRAM(s) SubCutaneous every 12 hours  influenza   Vaccine 0.5 milliLiter(s) IntraMuscular once  montelukast 10 milliGRAM(s) Oral daily  remdesivir  IVPB   IV Intermittent   remdesivir  IVPB 100 milliGRAM(s) IV Intermittent every 24 hours  tiotropium 18 MICROgram(s) Capsule 1 Capsule(s) Inhalation daily  zinc sulfate 220 milliGRAM(s) Oral daily    MEDICATIONS  (PRN):  acetaminophen   Tablet .. 650 milliGRAM(s) Oral every 4 hours PRN Temp greater or equal to 38.5C (101.3F)      Care Discussed with Consultants/Other Providers [x] YES  [ ] NO

## 2020-11-27 ENCOUNTER — TRANSCRIPTION ENCOUNTER (OUTPATIENT)
Age: 60
End: 2020-11-27

## 2020-11-27 LAB
ALBUMIN SERPL ELPH-MCNC: 3.4 G/DL — SIGNIFICANT CHANGE UP (ref 3.3–5)
ALP SERPL-CCNC: 61 U/L — SIGNIFICANT CHANGE UP (ref 40–120)
ALT FLD-CCNC: 46 U/L — HIGH (ref 10–45)
AST SERPL-CCNC: 21 U/L — SIGNIFICANT CHANGE UP (ref 10–40)
BILIRUB DIRECT SERPL-MCNC: 0.1 MG/DL — SIGNIFICANT CHANGE UP (ref 0–0.2)
BILIRUB INDIRECT FLD-MCNC: 0.5 MG/DL — SIGNIFICANT CHANGE UP (ref 0.2–1)
BILIRUB SERPL-MCNC: 0.6 MG/DL — SIGNIFICANT CHANGE UP (ref 0.2–1.2)
CREAT SERPL-MCNC: 0.93 MG/DL — SIGNIFICANT CHANGE UP (ref 0.5–1.3)
FERRITIN SERPL-MCNC: 1348 NG/ML — HIGH (ref 30–400)
PROT SERPL-MCNC: 6.5 G/DL — SIGNIFICANT CHANGE UP (ref 6–8.3)

## 2020-11-27 PROCEDURE — 99232 SBSQ HOSP IP/OBS MODERATE 35: CPT

## 2020-11-27 RX ADMIN — Medication 81 MILLIGRAM(S): at 11:08

## 2020-11-27 RX ADMIN — REMDESIVIR 500 MILLIGRAM(S): 5 INJECTION INTRAVENOUS at 11:07

## 2020-11-27 RX ADMIN — ENOXAPARIN SODIUM 40 MILLIGRAM(S): 100 INJECTION SUBCUTANEOUS at 17:25

## 2020-11-27 RX ADMIN — MONTELUKAST 10 MILLIGRAM(S): 4 TABLET, CHEWABLE ORAL at 11:08

## 2020-11-27 RX ADMIN — Medication 500 MILLIGRAM(S): at 11:08

## 2020-11-27 RX ADMIN — Medication 2000 UNIT(S): at 11:08

## 2020-11-27 RX ADMIN — Medication 6 MILLIGRAM(S): at 05:15

## 2020-11-27 RX ADMIN — BUDESONIDE AND FORMOTEROL FUMARATE DIHYDRATE 2 PUFF(S): 160; 4.5 AEROSOL RESPIRATORY (INHALATION) at 05:15

## 2020-11-27 RX ADMIN — ZINC SULFATE TAB 220 MG (50 MG ZINC EQUIVALENT) 220 MILLIGRAM(S): 220 (50 ZN) TAB at 11:08

## 2020-11-27 RX ADMIN — BUDESONIDE AND FORMOTEROL FUMARATE DIHYDRATE 2 PUFF(S): 160; 4.5 AEROSOL RESPIRATORY (INHALATION) at 17:25

## 2020-11-27 RX ADMIN — ENOXAPARIN SODIUM 40 MILLIGRAM(S): 100 INJECTION SUBCUTANEOUS at 05:15

## 2020-11-27 NOTE — DISCHARGE NOTE PROVIDER - NSDCMRMEDTOKEN_GEN_ALL_CORE_FT
Ecotrin Adult Low Strength 81 mg oral delayed release tablet: 1 tab(s) orally once a day  predniSONE: 30 milligram(s) orally once a day  Symbicort 160 mcg-4.5 mcg/inh inhalation aerosol: 2 puff(s) inhaled 1-2 x  a day  Tylenol 500 mg oral tablet: 2 tab(s) orally every 6 hours, As Needed  Ventolin 90 mcg/inh inhalation aerosol: 2 puff(s) inhaled every 6 hours, As Needed  Vitamin C 1000 mg oral tablet: 1 tab(s) orally once a day  Vitamin D3 2000 intl units (50 mcg) oral capsule: 1 cap(s) orally once a day  zinc (as acetate) 50 mg oral capsule: 1 cap(s) orally once a day   dexamethasone 2 mg oral tablet: 3 tab(s) orally once a day x 2 days  2 tab(s) orally once a day x 2 days  1 tab(s) orally once a day x 2 days  Ecotrin Adult Low Strength 81 mg oral delayed release tablet: 1 tab(s) orally once a day  montelukast 10 mg oral tablet: 1 tab(s) orally once a day  Symbicort 160 mcg-4.5 mcg/inh inhalation aerosol: 2 puff(s) inhaled 1-2 x  a day  Ventolin 90 mcg/inh inhalation aerosol: 2 puff(s) inhaled every 6 hours, As Needed  Vitamin C 1000 mg oral tablet: 1 tab(s) orally once a day  Vitamin D3 2000 intl units (50 mcg) oral capsule: 1 cap(s) orally once a day  zinc (as acetate) 50 mg oral capsule: 1 cap(s) orally once a day

## 2020-11-27 NOTE — DISCHARGE NOTE PROVIDER - NSDCCPCAREPLAN_GEN_ALL_CORE_FT
PRINCIPAL DISCHARGE DIAGNOSIS  Diagnosis: Shortness of breath  Assessment and Plan of Treatment: Resolved      SECONDARY DISCHARGE DIAGNOSES  Diagnosis: Suspected COVID-19 virus infection  Assessment and Plan of Treatment: You tested positive for COVID 19.  You no longer require hospitalization.  Quarantine yourself for 14 days  Please follow up with your PCP in 1-2 weeks -Call your Provider before hand to make then aware of your hospitalization   Take Tylenol for Fevers every 6 hours as needed- Do not exceed 4gm of Tylenol in a 24 hour period  Stay hydrated   WEAR A FACE MASK   Cover your cough and sneezes   Clean your hands often   Avoid sharing personal house hold items   Clean all high touch surfaces- everyday items like table tops , door knobs, cell phones etc   You should restrict activities outside your home except for getting medical care   Avoid using public transportation  Do not go to work, school, or Public areas   Monitor your oxygen saturation   Call [place] department of health at [phone]

## 2020-11-27 NOTE — PROGRESS NOTE ADULT - ATTENDING COMMENTS
as above: improving  multifactorial dyspnea-resp failure--covid 19 pneumonitis, asthma, obesity, debility--O2 -keep sat above 90%  covid 19 pneumonitis--D2/5 remdisivir and dexameth protocol , zn, vit d/c, melatonin, tylenol  asthma-symbicort, spiriva, singulair          Allergy-claritin/flonase  osas-out pt DD  DVT prophylaxis-enoxaparin   GI-pepcid 20 bid      OOB as able    BP control   DC planning -into weekend  Garrett Wood MD-Pulmonary   [phone] as above: improving-should be able to go home as of 11/28  multifactorial dyspnea-resp failure--covid 19 pneumonitis, asthma, obesity, debility--O2 -keep sat above 90%  covid 19 pneumonitis--D4/5 remdisivir and dexameth protocol , zn, vit d/c, melatonin, tylenol  asthma-symbicort, spiriva, singulair          Allergy-claritin/flonase  osas-out pt DD  DVT prophylaxis-enoxaparin   GI-pepcid 20 bid      OOB as able    BP control   DC planning -11/28  Garrett Wood MD-Pulmonary   [phone]

## 2020-11-27 NOTE — PROGRESS NOTE ADULT - ATTENDING COMMENTS
Dc planning after 5 days of Remdesivir, tentatively Saturaday morning, if his oxygenation is stable.   Pt with other family members at home. Wife and the daughters are positive, but other members are not.   Requesting re-swap.     - Dr. JOHNATHAN Madison ([facility])  - [phone]

## 2020-11-27 NOTE — PROGRESS NOTE ADULT - SUBJECTIVE AND OBJECTIVE BOX
Patient is a 60y old  Male who presents with a chief complaint of Covid 19 (27 Nov 2020 06:03)      SUBJECTIVE / OVERNIGHT EVENTS:  O2 status remain stable  no cp, no sob, no n/v/d. no abdominal pain.  no headache, no dizziness.   day 5/5 Remdesivir tomorrow   requesting for reswab      Vital Signs Last 24 Hrs  T(C): 36.4 (27 Nov 2020 13:27), Max: 36.7 (27 Nov 2020 05:00)  T(F): 97.6 (27 Nov 2020 13:27), Max: 98 (27 Nov 2020 05:00)  HR: 75 (27 Nov 2020 13:27) (56 - 75)  BP: 152/88 (27 Nov 2020 05:00) (152/88 - 154/89)  BP(mean): --  RR: 18 (27 Nov 2020 13:27) (18 - 18)  SpO2: 93% (27 Nov 2020 13:27) (93% - 94%)  I&O's Summary    26 Nov 2020 07:01  -  27 Nov 2020 07:00  --------------------------------------------------------  IN: 1140 mL / OUT: 0 mL / NET: 1140 mL    27 Nov 2020 07:01  -  27 Nov 2020 18:25  --------------------------------------------------------  IN: 600 mL / OUT: 0 mL / NET: 600 mL        PHYSICAL EXAM:  GENERAL: NAD, Comfortable, on room air   HEAD:  Atraumatic, Normocephalic  EYES: EOMI, PERRLA, conjunctiva and sclera clear  NECK: Supple, No JVD  CHEST/LUNG: Clear to auscultation bilaterally; No wheeze  HEART: Regular rate and rhythm; No murmurs, rubs, or gallops  ABDOMEN: Soft, Nontender, Nondistended; Bowel sounds present  Neuro: AAO x 3, no focal deficit, 5/5 b/l extremities  EXTREMITIES:  2+ Peripheral Pulses, No clubbing, cyanosis, or edema  SKIN: No rashes or lesions    LABS:                        14.7   5.97  )-----------( 252      ( 26 Nov 2020 06:35 )             44.7     11-27    x   |  x   |  x   ----------------------------<  x   x    |  x   |  0.93    Ca    9.2      26 Nov 2020 06:35    TPro  6.5  /  Alb  3.4  /  TBili  0.6  /  DBili  0.1  /  AST  21  /  ALT  46<H>  /  AlkPhos  61  11-27      CAPILLARY BLOOD GLUCOSE                RADIOLOGY & ADDITIONAL TESTS:    Imaging Personally Reviewed:  [x] YES  [ ] NO    Consultant(s) Notes Reviewed:  [x] YES  [ ] NO      MEDICATIONS  (STANDING):  ALBUTerol    90 MICROgram(s) HFA Inhaler 1 Puff(s) Inhalation every 6 hours  ascorbic acid 500 milliGRAM(s) Oral daily  aspirin enteric coated 81 milliGRAM(s) Oral daily  budesonide 160 MICROgram(s)/formoterol 4.5 MICROgram(s) Inhaler 2 Puff(s) Inhalation two times a day  cholecalciferol 2000 Unit(s) Oral daily  dexAMETHasone  Injectable 6 milliGRAM(s) IV Push daily  enoxaparin Injectable 40 milliGRAM(s) SubCutaneous every 12 hours  influenza   Vaccine 0.5 milliLiter(s) IntraMuscular once  montelukast 10 milliGRAM(s) Oral daily  remdesivir  IVPB   IV Intermittent   remdesivir  IVPB 100 milliGRAM(s) IV Intermittent every 24 hours  tiotropium 18 MICROgram(s) Capsule 1 Capsule(s) Inhalation daily  zinc sulfate 220 milliGRAM(s) Oral daily    MEDICATIONS  (PRN):  acetaminophen   Tablet .. 650 milliGRAM(s) Oral every 4 hours PRN Temp greater or equal to 38.5C (101.3F)      Care Discussed with Consultants/Other Providers [x] YES  [ ] NO

## 2020-11-27 NOTE — PROGRESS NOTE ADULT - PROBLEM SELECTOR PLAN 1
2' COVID-19; recent outpt diagnosis on 11/13; conversationally dyspneic w/ dec 02 saturation to low 90s on RA  still COVID-19 PCR (+) here and COVID-19 Ab is (+) as well  he received decadron 10 mg IVP x 1 on 11/23 and started 6 mg IVP daily on 11/24  he started remdesivir on 11/24/20 with plan for total 5 days, last day 11/28/20  duonebs q6h ATC in lieu of his handheld inhalers  incentive spirometer  maintain O2 sats above 92%, continue to wean down O2 as tolerates  rest as per covid management below

## 2020-11-27 NOTE — DISCHARGE NOTE PROVIDER - CARE PROVIDER_API CALL
Garrett Wood (MD)  Internal Medicine; Pulmonary Disease  [address], [address]  [place] [address]  Phone: [phone]  Fax: [phone]  Follow Up Time:

## 2020-11-27 NOTE — PROGRESS NOTE ADULT - ASSESSMENT
61 y/o M w/hx of lung nodule s/p resection, asthma, and SUZY presenting with COVID-19 infection. Patient saturating ok on room air in low-mid 90s. D. Dimer mildly elevated. Hyperkalemia likely secondary to hemolyzed sample. Chest pain likely pleurisy.  *******************  11/24-remains hypoxemic w/exertion  11/25-s/p RX 1 remdisivir, slow improvements 61 y/o M w/hx of lung nodule s/p resection, asthma, and SUZY presenting with COVID-19 infection. Patient saturating ok on room air in low-mid 90s. D. Dimer mildly elevated. Hyperkalemia likely secondary to hemolyzed sample. Chest pain likely pleurisy.  *******************  11/24-remains hypoxemic w/exertion  11/25-s/p RX 1 remdisivir, slow improvements  11/27-D4/5 remdisivir--improving

## 2020-11-27 NOTE — PROGRESS NOTE ADULT - SUBJECTIVE AND OBJECTIVE BOX
CHIEF COMPLAINT: f/up covid 19 pneumonitis, asthma, allergy, gerd, osas--still chest pain and sob on exertion and cough present but better      Interval Events:    REVIEW OF SYSTEMS:  Constitutional: No fevers or chills. No weight loss. No fatigue or generalized malaise.  Eyes: No itching or discharge from the eyes  ENT: No ear pain. No ear discharge. No nasal congestion. No post nasal drip. No epistaxis. No throat pain. No sore throat. No difficulty swallowing.   CV: No chest pain. No palpitations. No lightheadedness or dizziness.   Resp: No dyspnea at rest. No dyspnea on exertion. No orthopnea. No wheezing. No cough. No stridor. No sputum production. No chest pain with respiration.  GI: No nausea. No vomiting. No diarrhea.  MSK: No joint pain or pain in any extremities  Integumentary: No skin lesions. No pedal edema.  Neurological: No gross motor weakness. No sensory changes.  [ ] All other systems negative  [ ] Unable to assess ROS because ________    OBJECTIVE:  ICU Vital Signs Last 24 Hrs  T(C): 36.7 (27 Nov 2020 05:00), Max: 36.8 (26 Nov 2020 17:45)  T(F): 98 (27 Nov 2020 05:00), Max: 98.3 (26 Nov 2020 17:45)  HR: 56 (27 Nov 2020 05:00) (56 - 59)  BP: 152/88 (27 Nov 2020 05:00) (151/93 - 167/90)  BP(mean): --  ABP: --  ABP(mean): --  RR: 18 (27 Nov 2020 05:00) (18 - 18)  SpO2: 93% (27 Nov 2020 05:00) (92% - 94%)        11-25 @ 07:01  -  11-26 @ 07:00  --------------------------------------------------------  IN: 480 mL / OUT: 0 mL / NET: 480 mL    11-26 @ 07:01  -  11-27 @ 06:04  --------------------------------------------------------  IN: 1140 mL / OUT: 0 mL / NET: 1140 mL      CAPILLARY BLOOD GLUCOSE          PHYSICAL EXAM:  General: Awake, alert, oriented X 3.   HEENT: Atraumatic, normocephalic.                 Mallampatti Grade                 No nasal congestion.                No tonsillar or pharyngeal exudates.  Lymph Nodes: No palpable lymphadenopathy  Neck: No JVD. No carotid bruit.   Respiratory: Normal chest expansion                         Normal percussion                         Normal and equal air entry                         No wheeze, rhonchi or rales.  Cardiovascular: S1 S2 normal. No murmurs, rubs or gallops.   Abdomen: Soft, non-tender, non-distended. No organomegaly. Normoactive bowel sounds.  Extremities: Warm to touch. Peripheral pulse palpable. No pedal edema.   Skin: No rashes or skin lesions  Neurological: Motor and sensory examination equal and normal in all four extremities.  Psychiatry: Appropriate mood and affect.    HOSPITAL MEDICATIONS:  MEDICATIONS  (STANDING):  ALBUTerol    90 MICROgram(s) HFA Inhaler 1 Puff(s) Inhalation every 6 hours  ascorbic acid 500 milliGRAM(s) Oral daily  aspirin enteric coated 81 milliGRAM(s) Oral daily  budesonide 160 MICROgram(s)/formoterol 4.5 MICROgram(s) Inhaler 2 Puff(s) Inhalation two times a day  cholecalciferol 2000 Unit(s) Oral daily  dexAMETHasone  Injectable 6 milliGRAM(s) IV Push daily  enoxaparin Injectable 40 milliGRAM(s) SubCutaneous every 12 hours  influenza   Vaccine 0.5 milliLiter(s) IntraMuscular once  montelukast 10 milliGRAM(s) Oral daily  remdesivir  IVPB   IV Intermittent   remdesivir  IVPB 100 milliGRAM(s) IV Intermittent every 24 hours  tiotropium 18 MICROgram(s) Capsule 1 Capsule(s) Inhalation daily  zinc sulfate 220 milliGRAM(s) Oral daily    MEDICATIONS  (PRN):  acetaminophen   Tablet .. 650 milliGRAM(s) Oral every 4 hours PRN Temp greater or equal to 38.5C (101.3F)      LABS:                        14.7   5.97  )-----------( 252      ( 26 Nov 2020 06:35 )             44.7     11-26    140  |  105  |  27<H>  ----------------------------<  157<H>  4.1   |  22  |  0.90    Ca    9.2      26 Nov 2020 06:35    TPro  6.5  /  Alb  3.5  /  TBili  0.4  /  DBili  x   /  AST  16  /  ALT  44  /  AlkPhos  67  11-26              MICROBIOLOGY:     RADIOLOGY:  [ ] Reviewed and interpreted by me    Point of Care Ultrasound Findings:    PFT:    EKG: CHIEF COMPLAINT: f/up covid 19 pneumonitis, asthma, allergy, gerd, osas--markedly improved-mild sob on exertion and cough present      Interval Events: room transfer    REVIEW OF SYSTEMS:  Constitutional: No fevers or chills. No weight loss. + fatigue or generalized malaise.  Eyes: No itching or discharge from the eyes  ENT: No ear pain. No ear discharge. No nasal congestion. No post nasal drip. No epistaxis. No throat pain. No sore throat. No difficulty swallowing.   CV: No chest pain. No palpitations. No lightheadedness or dizziness.   Resp: No dyspnea at rest. +dyspnea on exertion. No orthopnea. No wheezing. No cough. No stridor. No sputum production. No chest pain with respiration.  GI: No nausea. No vomiting. No diarrhea.  MSK: No joint pain or pain in any extremities  Integumentary: No skin lesions. No pedal edema.  Neurological: No gross motor weakness. No sensory changes.  [+ ] All other systems negative  [ ] Unable to assess ROS because ________    OBJECTIVE:  ICU Vital Signs Last 24 Hrs  T(C): 36.7 (27 Nov 2020 05:00), Max: 36.8 (26 Nov 2020 17:45)  T(F): 98 (27 Nov 2020 05:00), Max: 98.3 (26 Nov 2020 17:45)  HR: 56 (27 Nov 2020 05:00) (56 - 59)  BP: 152/88 (27 Nov 2020 05:00) (151/93 - 167/90)  BP(mean): --  ABP: --  ABP(mean): --  RR: 18 (27 Nov 2020 05:00) (18 - 18)  SpO2: 93% (27 Nov 2020 05:00) (92% - 94%)        11-25 @ 07:01  -  11-26 @ 07:00  --------------------------------------------------------  IN: 480 mL / OUT: 0 mL / NET: 480 mL    11-26 @ 07:01  -  11-27 @ 06:04  --------------------------------------------------------  IN: 1140 mL / OUT: 0 mL / NET: 1140 mL      CAPILLARY BLOOD GLUCOSE          PHYSICAL EXAM:  General: Awake, alert, oriented X 3.   HEENT: Atraumatic, normocephalic.                 Mallampatti Grade 3                No nasal congestion.                No tonsillar or pharyngeal exudates.  Lymph Nodes: No palpable lymphadenopathy  Neck: No JVD. No carotid bruit.   Respiratory: Normal chest expansion                         Normal percussion                         Normal and equal air entry                         No wheeze, rhonchi but faint inspiratory rales.  Cardiovascular: S1 S2 normal. No murmurs, rubs or gallops.   Abdomen: Soft, non-tender, non-distended. No organomegaly. Normoactive bowel sounds.  Extremities: Warm to touch. Peripheral pulse palpable. No pedal edema.   Skin: No rashes or skin lesions  Neurological: Motor and sensory examination equal and normal in all four extremities.  Psychiatry: Appropriate mood and affect.    HOSPITAL MEDICATIONS:  MEDICATIONS  (STANDING):  ALBUTerol    90 MICROgram(s) HFA Inhaler 1 Puff(s) Inhalation every 6 hours  ascorbic acid 500 milliGRAM(s) Oral daily  aspirin enteric coated 81 milliGRAM(s) Oral daily  budesonide 160 MICROgram(s)/formoterol 4.5 MICROgram(s) Inhaler 2 Puff(s) Inhalation two times a day  cholecalciferol 2000 Unit(s) Oral daily  dexAMETHasone  Injectable 6 milliGRAM(s) IV Push daily  enoxaparin Injectable 40 milliGRAM(s) SubCutaneous every 12 hours  influenza   Vaccine 0.5 milliLiter(s) IntraMuscular once  montelukast 10 milliGRAM(s) Oral daily  remdesivir  IVPB   IV Intermittent   remdesivir  IVPB 100 milliGRAM(s) IV Intermittent every 24 hours  tiotropium 18 MICROgram(s) Capsule 1 Capsule(s) Inhalation daily  zinc sulfate 220 milliGRAM(s) Oral daily    MEDICATIONS  (PRN):  acetaminophen   Tablet .. 650 milliGRAM(s) Oral every 4 hours PRN Temp greater or equal to 38.5C (101.3F)      LABS:                        14.7   5.97  )-----------( 252      ( 26 Nov 2020 06:35 )             44.7     11-26    140  |  105  |  27<H>  ----------------------------<  157<H>  4.1   |  22  |  0.90    Ca    9.2      26 Nov 2020 06:35    TPro  6.5  /  Alb  3.5  /  TBili  0.4  /  DBili  x   /  AST  16  /  ALT  44  /  AlkPhos  67  11-26              MICROBIOLOGY:     RADIOLOGY:  [ ] Reviewed and interpreted by me    Point of Care Ultrasound Findings:    PFT:    EKG:

## 2020-11-27 NOTE — DISCHARGE NOTE PROVIDER - HOSPITAL COURSE
60 yr old male w/ PMH of "elevated cholesterol" (not on medications), lung nodules s/p resection (from 9/11, not mesothelioma), asthma, and SUZY with recent diagnosis of COVID (+ pcr on 11/13) presents with shortness of breath and fevers.     Problem/Plan - 1:  ·  Problem: Shortness of breath.  Plan: 2' COVID-19; recent outpt diagnosis on 11/13; conversationally dyspneic w/ dec 02 saturation to low 90s on RA  still COVID-19 PCR (+) here and COVID-19 Ab is (+) as well  he received decadron 10 mg IVP x 1 on 11/23 and started 6 mg IVP daily on 11/24  he started remdesivir on 11/24/20 with plan for total 5 days, last day 11/28/20  duonebs q6h ATC in lieu of his handheld inhalers  incentive spirometer  maintain O2 sats above 92%, continue to wean down O2 as tolerates  rest as per covid management below.      Problem/Plan - 2:  ·  Problem: 2019 novel coronavirus disease (COVID-19).  Plan: Positive outpt covid pcr on 11/13; w/ sx as above; mild D dimer elevation; CXR w/ patchy opacities; currently maintaining O2 sat on RA.  still COVID-19 PCR (+) here and COVID-19 Ab is (+) as well  initial inflammatory labs: D dimer- 300; Fibrinogen 1148; CRP 16.54; Procal 0.21  started on pred 30 mg (equiv to dexameth 4.5 mg) on 11/19 by pulm  c/w Remdesivir and Decadron as above.  duonebs q6h ATC in lieu of his handheld inhalers  incentive spirometer  maintain O2 sats above 92%  cont with enoxaparin bid  inflammatory markers q3days  appreciate pulmonology.      Problem/Plan - 3:  ·  Problem: Asthma.  Plan: cont duonebs q6h ATC.      Problem/Plan - 4:  ·  Problem: Sarcoidosis.  Plan: not active inpt issue.   d/c after remdesivir on 11/27 and after reswab.... 60 yr old male w/ PMH of "elevated cholesterol" (not on medications), lung nodules s/p resection (from 9/11, not mesothelioma), asthma, and SUZY with recent diagnosis of COVID (+ pcr on 11/13) presents with shortness of breath and fevers.+COVID-19; recent outpt diagnosis on 11/13; conversationally dyspneic w/ dec 02 saturation to low 90s on RA  still COVID-19 PCR (+) here and COVID-19 Ab is (+); ID and Pulmonary consulted... pt received decadron 10 mg IVP x 1 on 11/23 and started 6 mg IVP daily on 11/24  he started remdesivir on 11/24/20 with plan for total 5 days, last day 11/28/20 Duoneb q6h ATC in lieu of his handheld inhalers. Patient is currently 92% on RA no apparent distress noted.     60 yr old male w/ PMH of "elevated cholesterol" (not on medications), lung nodules s/p resection (from 9/11, not mesothelioma), asthma, and SUYZ with recent diagnosis of COVID (+ pcr on 11/13) presents with shortness of breath and fevers.+COVID-19; recent outpt diagnosis on 11/13; conversationally dyspneic w/ dec 02 saturation to low 90s on RA.  still COVID-19 PCR (+) here and COVID-19 Ab is (+); ID and Pulmonary consulted... pt received decadron 10 mg IVP x 1 on 11/23 and started 6 mg IVP daily on 11/24  he started remdesivir on 11/24/20 with plan for total 5 days, last day 11/28/20 Duoneb q6h ATC in lieu of his handheld inhalers. Patient is currently 92% on RA no apparent distress noted.

## 2020-11-28 ENCOUNTER — TRANSCRIPTION ENCOUNTER (OUTPATIENT)
Age: 60
End: 2020-11-28

## 2020-11-28 VITALS
DIASTOLIC BLOOD PRESSURE: 80 MMHG | TEMPERATURE: 98 F | HEART RATE: 94 BPM | RESPIRATION RATE: 18 BRPM | SYSTOLIC BLOOD PRESSURE: 145 MMHG | OXYGEN SATURATION: 92 %

## 2020-11-28 LAB
ALBUMIN SERPL ELPH-MCNC: 3.8 G/DL — SIGNIFICANT CHANGE UP (ref 3.3–5)
ALBUMIN SERPL ELPH-MCNC: 4 G/DL — SIGNIFICANT CHANGE UP (ref 3.3–5)
ALP SERPL-CCNC: 65 U/L — SIGNIFICANT CHANGE UP (ref 40–120)
ALP SERPL-CCNC: 72 U/L — SIGNIFICANT CHANGE UP (ref 40–120)
ALT FLD-CCNC: 78 U/L — HIGH (ref 10–45)
ALT FLD-CCNC: 95 U/L — HIGH (ref 10–45)
AST SERPL-CCNC: 32 U/L — SIGNIFICANT CHANGE UP (ref 10–40)
AST SERPL-CCNC: 40 U/L — SIGNIFICANT CHANGE UP (ref 10–40)
BILIRUB DIRECT SERPL-MCNC: 0.2 MG/DL — SIGNIFICANT CHANGE UP (ref 0–0.2)
BILIRUB DIRECT SERPL-MCNC: 0.2 MG/DL — SIGNIFICANT CHANGE UP (ref 0–0.2)
BILIRUB INDIRECT FLD-MCNC: 0.5 MG/DL — SIGNIFICANT CHANGE UP (ref 0.2–1)
BILIRUB INDIRECT FLD-MCNC: 0.6 MG/DL — SIGNIFICANT CHANGE UP (ref 0.2–1)
BILIRUB SERPL-MCNC: 0.7 MG/DL — SIGNIFICANT CHANGE UP (ref 0.2–1.2)
BILIRUB SERPL-MCNC: 0.8 MG/DL — SIGNIFICANT CHANGE UP (ref 0.2–1.2)
CREAT SERPL-MCNC: 0.94 MG/DL — SIGNIFICANT CHANGE UP (ref 0.5–1.3)
CULTURE RESULTS: SIGNIFICANT CHANGE UP
CULTURE RESULTS: SIGNIFICANT CHANGE UP
PROT SERPL-MCNC: 6.7 G/DL — SIGNIFICANT CHANGE UP (ref 6–8.3)
PROT SERPL-MCNC: 7.3 G/DL — SIGNIFICANT CHANGE UP (ref 6–8.3)
SARS-COV-2 RNA SPEC QL NAA+PROBE: DETECTED
SPECIMEN SOURCE: SIGNIFICANT CHANGE UP
SPECIMEN SOURCE: SIGNIFICANT CHANGE UP

## 2020-11-28 PROCEDURE — 84132 ASSAY OF SERUM POTASSIUM: CPT

## 2020-11-28 PROCEDURE — 85014 HEMATOCRIT: CPT

## 2020-11-28 PROCEDURE — 85384 FIBRINOGEN ACTIVITY: CPT

## 2020-11-28 PROCEDURE — 80053 COMPREHEN METABOLIC PANEL: CPT

## 2020-11-28 PROCEDURE — 83605 ASSAY OF LACTIC ACID: CPT

## 2020-11-28 PROCEDURE — 82330 ASSAY OF CALCIUM: CPT

## 2020-11-28 PROCEDURE — 85018 HEMOGLOBIN: CPT

## 2020-11-28 PROCEDURE — 71045 X-RAY EXAM CHEST 1 VIEW: CPT

## 2020-11-28 PROCEDURE — 86140 C-REACTIVE PROTEIN: CPT

## 2020-11-28 PROCEDURE — 84295 ASSAY OF SERUM SODIUM: CPT

## 2020-11-28 PROCEDURE — 82435 ASSAY OF BLOOD CHLORIDE: CPT

## 2020-11-28 PROCEDURE — 82803 BLOOD GASES ANY COMBINATION: CPT

## 2020-11-28 PROCEDURE — 99285 EMERGENCY DEPT VISIT HI MDM: CPT | Mod: 25

## 2020-11-28 PROCEDURE — 80076 HEPATIC FUNCTION PANEL: CPT

## 2020-11-28 PROCEDURE — 85730 THROMBOPLASTIN TIME PARTIAL: CPT

## 2020-11-28 PROCEDURE — U0003: CPT

## 2020-11-28 PROCEDURE — 82565 ASSAY OF CREATININE: CPT

## 2020-11-28 PROCEDURE — 82947 ASSAY GLUCOSE BLOOD QUANT: CPT

## 2020-11-28 PROCEDURE — 86769 SARS-COV-2 COVID-19 ANTIBODY: CPT

## 2020-11-28 PROCEDURE — 85379 FIBRIN DEGRADATION QUANT: CPT

## 2020-11-28 PROCEDURE — 82728 ASSAY OF FERRITIN: CPT

## 2020-11-28 PROCEDURE — 86803 HEPATITIS C AB TEST: CPT

## 2020-11-28 PROCEDURE — 85027 COMPLETE CBC AUTOMATED: CPT

## 2020-11-28 PROCEDURE — 85610 PROTHROMBIN TIME: CPT

## 2020-11-28 PROCEDURE — 84145 PROCALCITONIN (PCT): CPT

## 2020-11-28 PROCEDURE — 80048 BASIC METABOLIC PNL TOTAL CA: CPT

## 2020-11-28 PROCEDURE — 96374 THER/PROPH/DIAG INJ IV PUSH: CPT

## 2020-11-28 PROCEDURE — 85025 COMPLETE CBC W/AUTO DIFF WBC: CPT

## 2020-11-28 PROCEDURE — 93005 ELECTROCARDIOGRAM TRACING: CPT

## 2020-11-28 PROCEDURE — 87040 BLOOD CULTURE FOR BACTERIA: CPT

## 2020-11-28 PROCEDURE — 94640 AIRWAY INHALATION TREATMENT: CPT

## 2020-11-28 RX ORDER — DEXAMETHASONE 0.5 MG/5ML
1 ELIXIR ORAL
Qty: 12 | Refills: 0
Start: 2020-11-28 | End: 2020-12-03

## 2020-11-28 RX ORDER — MONTELUKAST 4 MG/1
1 TABLET, CHEWABLE ORAL
Qty: 0 | Refills: 0 | DISCHARGE
Start: 2020-11-28

## 2020-11-28 RX ORDER — MONTELUKAST 4 MG/1
1 TABLET, CHEWABLE ORAL
Qty: 30 | Refills: 0
Start: 2020-11-28 | End: 2020-12-27

## 2020-11-28 RX ORDER — ACETAMINOPHEN 500 MG
2 TABLET ORAL
Qty: 0 | Refills: 0 | DISCHARGE

## 2020-11-28 RX ADMIN — Medication 2000 UNIT(S): at 11:59

## 2020-11-28 RX ADMIN — Medication 81 MILLIGRAM(S): at 11:59

## 2020-11-28 RX ADMIN — Medication 6 MILLIGRAM(S): at 05:46

## 2020-11-28 RX ADMIN — Medication 500 MILLIGRAM(S): at 11:59

## 2020-11-28 RX ADMIN — REMDESIVIR 500 MILLIGRAM(S): 5 INJECTION INTRAVENOUS at 10:48

## 2020-11-28 RX ADMIN — MONTELUKAST 10 MILLIGRAM(S): 4 TABLET, CHEWABLE ORAL at 12:00

## 2020-11-28 RX ADMIN — BUDESONIDE AND FORMOTEROL FUMARATE DIHYDRATE 2 PUFF(S): 160; 4.5 AEROSOL RESPIRATORY (INHALATION) at 05:47

## 2020-11-28 RX ADMIN — ZINC SULFATE TAB 220 MG (50 MG ZINC EQUIVALENT) 220 MILLIGRAM(S): 220 (50 ZN) TAB at 11:59

## 2020-11-28 RX ADMIN — ENOXAPARIN SODIUM 40 MILLIGRAM(S): 100 INJECTION SUBCUTANEOUS at 05:47

## 2020-11-28 NOTE — CHART NOTE - NSCHARTNOTEFT_GEN_A_CORE
Dr. Madison has medically cleared patient for discharge home. Medication reconciliation reviewed, revised and resolved with Dr. madison who advised to continue dexamethasone taper. Patient is awake and responsive to all stimuli and no apparent distress.

## 2020-11-28 NOTE — DISCHARGE NOTE NURSING/CASE MANAGEMENT/SOCIAL WORK - PATIENT PORTAL LINK FT
You can access the FollowMyHealth Patient Portal offered by [facility] by registering at the following website: [email or web address]. By joining [facility]’s FollowMyHealth portal, you will also be able to view your health information using other applications (apps) compatible with our system.

## 2020-12-01 ENCOUNTER — APPOINTMENT (OUTPATIENT)
Dept: PULMONOLOGY | Facility: CLINIC | Age: 60
End: 2020-12-01
Payer: COMMERCIAL

## 2020-12-01 VITALS — WEIGHT: 220 LBS | HEIGHT: 72 IN | BODY MASS INDEX: 29.8 KG/M2

## 2020-12-01 PROCEDURE — 99214 OFFICE O/P EST MOD 30 MIN: CPT | Mod: 95

## 2020-12-01 NOTE — ASSESSMENT
[FreeTextEntry1] : Mr. Keen is a 60 year old male with a history of asthma, allergy, GERD, OSAS, and extensive occupational exposure in the workplace (s/p 9/11), video calling to the office for a follow up visit s/p Covid19 Pneumonitis ([facility] admit)\par \par His shortness of breath is multifactorial due to: \par -overweight/out of shape\par -poor breathing mechanics\par -asthma\par -?CAD\par \par problem 1: mild persistent asthma (stressed compliance 11/16/2020) \par -continue Symbicort 160 at 2 inhalations BID or Advair 250 1 inhalation BID\par - Continue Incruse 1 inhalation Qd\par -continue Singulair 10 mg before bed \par -Continue Ventolin as a rescue inhaler 2 inhalations pre-exercise\par -Asthma is  believed to be caused by inherited (genetic) and environmental factor, but its exact cause is unknown. Asthma may be triggered by allergens, lung infections, or irritants in the air. Asthma triggers are different for each person\par -Inhaler technique reviewed as well as oral hygiene techniques reviewed with patient. Avoidance of cold air, extremes of temperature, rescue inhaler should be used before exercise. Order of medication reviewed with patient. Recommended use of a cool mist humidifier in the bedroom. Instructed to gargle and spit after inhaler use. \par \par Problem 1A: COVID 19 infection\par -Continue Dexamethasone 6mg to complete 10 days\par - Add eliquis 5mg BID\par -recommended positional sleep (rotating every couple of hours) \par -continue Tessalon Perles 200 mg Q8H \par -recommended baby Aspirin qday\par COVID-19 precautionary Immune Support Recommendations:\par -OTC Vitamin C 500mg BID \par -OTC Quercetin 250-500mg BID \par -OTC Zinc 75-100mg per day \par -OTC Melatonin 1 or 2mg a night \par -OTC Vitamin D 1-4000mg per day \par -OTC Tonic Water 8oz per day\par -Water 0.5-1 gallon per day\par -continue Berberine 1000 BID\par \par Joint Support Supplements: \par -Liposomal Glutathione 500 mg BID\par -SPM 1 tablet BID \par -Turmeric \par \par problem 2: allergies/sinus\par -continue to use Astelin 0.15 1 sniff each nostril BID\par - Continue Clarinex 5 mg QHS\par -Environmental measures for allergies were encouraged including mattress and pillow cover, air purifier, and environmental controls. \par \par problem 3: GERD\par -continue Pepcid 40 mg QHS\par -recommended to increase hydration\par -Things to avoid including overeating, spicy foods, tight clothing, eating within three hours of bed, this list is not all inclusive. \par -For treatment of reflux, possible options discussed including diet control, H2 blockers, PPIs, as well as coating motility agents discussed as treatment options. Timing of meals and proximity of last meal to sleep were discussed. If symptoms persist, a formal gastrointestinal evaluation is needed.\par -Rule of 2's: Avoid eating too late, too fast, too much, too spicy or within two hours of bedtime \par \par problem 4: moderate sleep apnea \par -his sleep study showed an AH of 24.4\par - Recommended to use Oxy Aid or the chin strap\par -being recommended to see Bambi Farrell / Warren Mcdaniel for an oral appliance (NONCOMPLIANT)\par -Sleep apnea is associated with adverse clinical consequences which an affect most organ systems.  Cardiovascular disease risk includes arrhythmias, atrial fibrillation, hypertension, coronary artery disease, and stroke. Metabolic disorders include diabetes type 2, non-alcoholic fatty liver disease. Mood disorder especially depression; and cognitive decline especially in the elderly. Associations with  chronic reflux/Meyers’s esophagus some but not all inclusive. \par -Reasons  include arousal consistent with hypopnea; respiratory events most prominent in REM sleep or supine position; therefore sleep staging and body position are important for accurate diagnosis and estimation of AHI. \par \par problem 5: overweight/out of shape\par -Weight loss, exercise, and diet control were discussed and are highly encouraged. Treatment options were \par given such as, aqua therapy, and contacting a nutritionist.\par \par problem 6: poor breathing mechanics\par -Proper breathing techniques were reviewed with an emphasis of exhalation. Patient instructed to breath in for 1 second and out for four seconds. Patient was encouraged to not talk while walking.\par \par problem 7: ?CAD\par -recommended to follow up with a cardiologist\par \par Problem 8: Abnormal CT \par - Script given for follow up CT scan of the chest 6/2020\par - CAT scans are the only radiological modality to identify abnormalities w/in the lungs with regards to nodules/masses/lymph nodes. Risks, benefits were reviewed in detail. The guidelines for abnormalities include follow up CT scans at various intervals which could range from 6 weeks to 1 year intervals. If there is a change for the worse then consideration for a biopsy will be considered if you are a candidate. Second opinion evaluation with a thoracic surgeon or an interventional radiologist could be offered.\par \par Problem 9: Health maintenance \par -s/p flu shot 2019\par -recommended strep pneumonia vaccines: Prevnar-13 vaccine, followed by Pneumo vaccine 23 one year following\par -recommended early intervention for URIs\par -recommended regular osteoporosis evaluations\par -recommended early dermatological evaluations\par -recommended after the age of 50 to the age of 70, colonoscopy every 5 years\par \par F/U in 4 months with SPI and NiOx\par He is encouraged to call with any changes, concerns, or questions

## 2020-12-01 NOTE — HISTORY OF PRESENT ILLNESS
[Home] : at home, [unfilled] , at the time of the visit. [Medical Office: ([facility])___] : at the medical office located in  [Verbal consent obtained from patient] : the patient, [unfilled] [FreeTextEntry1] : Mr. Keen is a 60 year old male video calling to the office for a follow up visit for abnormal CT, acid reflux disease, asthma, SUZY.\par - was in hospital with covid19 pneumonitis \par - remdesivir and dexamethasone\par - discharged on saturday \par -He is currently on Dexamethasone and blood thinner (Decatrin)\par -He has been feeling much better since discharged from hospital\par -He notes his breathing is much better \par -He notes having no back pain \par -He notes still not having his full sense of taste\par -His sleep is well\par -\par - denies any headaches, nausea, vomiting, fever, chills, sweats, chest pain, chest pressure, diarrhea, constipation, dysphagia, dizziness, leg swelling, leg pain, itchy eyes, itchy ears, heartburn, reflux, or sour taste in the mouth.

## 2020-12-01 NOTE — ADDENDUM
[FreeTextEntry1] : Documented by Arlette Velasco acting as a scribe for Dr. Garrett Wood on (12/01/2020).\par \par All medical record entries made by the Scribe were at my, Dr. Garrett Wood's, direction and personally dictated by me on (12/01/2020). I have reviewed the chart and agree that the record accurately reflects my personal performance of the history, physical exam, assessment and plan. I have also personally directed, reviewed, and agree with the discharge instructions. \par \par \par

## 2021-01-22 DIAGNOSIS — Z01.812 ENCOUNTER FOR PREPROCEDURAL LABORATORY EXAMINATION: ICD-10-CM

## 2021-03-31 ENCOUNTER — APPOINTMENT (OUTPATIENT)
Dept: PULMONOLOGY | Facility: CLINIC | Age: 61
End: 2021-03-31

## 2021-04-29 ENCOUNTER — RX RENEWAL (OUTPATIENT)
Age: 61
End: 2021-04-29

## 2021-04-29 RX ORDER — FAMOTIDINE 40 MG/1
40 TABLET, FILM COATED ORAL
Qty: 90 | Refills: 1 | Status: ACTIVE | COMMUNITY
Start: 2020-03-03 | End: 1900-01-01

## 2021-06-18 NOTE — H&P PST ADULT - SKIN/BREAST
HISTORY/PHYSICAL EXAM  Luba Nguyen   64 y.o. female  Is here for a physical healthcare maintenance examination        Assessment/Plan for  Luba Nguyen is a 64 y.o. female.       1.  Healthcare maintenance examination  2.  Postmenopausal with at bedtime vasomotor symptomatology-primarily sweating.  Her dose of Premarin was reduced.  She has had a MIMI with BSO.  We discussed alternative oral therapy-Climara patch 0.05- apply weekly  3.  SVT- currently on beta-blocker.  Will decrease to 25 mg per Dr. Granda.   4.  Hypothyroidism on replacement      Plan:  1.  DC oral estrogen  Climara patch  Laboratory  Reviewed cardiology  Reviewed recent cholesterol  Continue s 3 times weekly Crestor           There are no Patient Instructions on file for this visit.    Diagnoses and all orders for this visit:    General physical deterioration  -     Erythrocyte Sedimentation Rate  -     Urinalysis-UC if Indicated  -     Basic Metabolic Panel  -     Hepatic Profile  -     Vitamin D, Total (25-Hydroxy)    Postmenopausal  -     estradiol (CLIMARA) 0.05 mg/24 hr; Place 1 patch on the skin every 7 days.  Dispense: 12 patch; Refill: 3    SVT (supraventricular tachycardia)    Hypothyroidism  -     Thyroid Cascade          Medications:  Medications after visit  Current Outpatient Prescriptions   Medication Sig Dispense Refill     cyanocobalamin (VITAMIN B-12) 1,000 mcg/mL injection Inject 1 mL (1,000 mcg total) into the shoulder, thigh, or buttocks every 30 (thirty) days. 10 mL 1     docusate sodium (COLACE) 100 MG capsule Take 1 capsule (100 mg total) by mouth 2 (two) times a day. 60 capsule 2     ergocalciferol (ERGOCALCIFEROL) 50,000 unit capsule TAKE 1 CAPSULE BY MOUTH EVERY WEEK 26 capsule 0     ibuprofen (ADVIL,MOTRIN) 600 MG tablet Take 1 tablet (600 mg total) by mouth 3 (three) times a day. 30 tablet 2     LACTOBACILLUS RHAMNOSUS GG (CULTURELLE ORAL) Take by mouth.       lansoprazole (PREVACID) 30 MG capsule Take 1  capsule (30 mg total) by mouth daily with breakfast. 90 capsule 3     levothyroxine (SYNTHROID) 125 MCG tablet Take 1 tablet (125 mcg total) by mouth Daily at 6:00 am. 90 tablet 3     MELATONIN ORAL Take by mouth.       metoprolol succinate (TOPROL-XL) 50 MG 24 hr tablet Take 1 tablet (50 mg total) by mouth daily. (Patient taking differently: Take 25 mg by mouth daily. ) 180 tablet 3     PREMARIN 0.45 mg tablet TAKE 1 TABLET (0.45 MG TOTAL) BY MOUTH DAILY. 90 tablet 2     rosuvastatin (CRESTOR) 5 MG tablet Take 1 tablet Monday Wednesday Friday 30 tablet 3     VIT A/VIT C/VIT E/ZINC/COPPER (ICAPS AREDS ORAL) Take by mouth.       estradiol (CLIMARA) 0.05 mg/24 hr Place 1 patch on the skin every 7 days. 12 patch 3     lansoprazole (PREVACID) 30 MG capsule TAKE 1 CAPSULE BY MOUTH TWICE A  capsule 3     Current Facility-Administered Medications   Medication Dose Route Frequency Provider Last Rate Last Dose     cyanocobalamin injection 1,000 mcg  1,000 mcg Intramuscular Q30 Days Theodore Moctezuma MD   1,000 mcg at 05/11/18 1622              Perez Leroy MD  Internal medicine  [facility]  [phone]  [email or web address]      This is an electronically verified report by Perez Leroy M.D.  (Note created with Dragon voice recognition and unintended spelling errors and word substitutions may occur)        SUBJECTIVE/HPI    Chief Complaint:  Annual Exam (Non fasting)  Here for annual exam    Doing well    No further SVT.  On beta-blocker.  Recently saw cardiology  Reviewed  Recommended decreasing beta-blocker    Having a just diaphoresis  Premarin dosage was decreased  She is interested in transdermal estrogen after discussion.        Review of Systems:   Extensive 14-point comprehensive review of systems was performed.   Patient reports  1.  Appetite is good  2.  Patient is chronically constipated.  She was seen in [facility].  She is on daily fiber.  She does take  probiotics.    Otherwise, the following systems are negative including constitutional, eyes, ears, nose and throat, cardiovascular, respiratory, gastrointestinal, genitourinary, musculoskeletal,neurological, skin and/or breast, endocrine, hematologic/lymph, allergic/immunologic and psychiatric.  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     Benign positional vertigo      Depression      Facial paralysis 2007     Family history of myocardial infarction      GERD (gastroesophageal reflux disease)      History of herpes zoster      Hyperglycemia      Hyperlipidemia      Hypertension      Hypothyroidism      Low serum vitamin D      Low vitamin B12 level      Osteopenia      Peripheral neuropathy 2011     Patient Active Problem List    Diagnosis Date Noted     Ischemic chest pain 05/08/2017     Heart palpitations 05/08/2017     GERD (gastroesophageal reflux disease)      Hyperglycemia      Low vitamin B12 level      Hypertension      Hyperlipidemia      Hypothyroidism      Low serum vitamin D         Family History  Family History   Problem Relation Age of Onset     Hypertension Mother      Stroke Mother      Anemia Mother      Mitral Regurgitation Mother      Atrial fibrillation Mother      Dementia Father      Heart failure Father      HEART DISEASE     Coronary artery disease Father      Breast cancer Maternal Aunt              Medications:  Current Outpatient Prescriptions on File Prior to Visit   Medication Sig     cyanocobalamin (VITAMIN B-12) 1,000 mcg/mL injection Inject 1 mL (1,000 mcg total) into the shoulder, thigh, or buttocks every 30 (thirty) days.     docusate sodium  (COLACE) 100 MG capsule Take 1 capsule (100 mg total) by mouth 2 (two) times a day.     ergocalciferol (ERGOCALCIFEROL) 50,000 unit capsule TAKE 1 CAPSULE BY MOUTH EVERY WEEK     ibuprofen (ADVIL,MOTRIN) 600 MG tablet Take 1 tablet (600 mg total) by mouth 3 (three) times a day.     LACTOBACILLUS RHAMNOSUS GG (CULTURELLE ORAL) Take by mouth.     lansoprazole (PREVACID) 30 MG capsule Take 1 capsule (30 mg total) by mouth daily with breakfast.     levothyroxine (SYNTHROID) 125 MCG tablet Take 1 tablet (125 mcg total) by mouth Daily at 6:00 am.     MELATONIN ORAL Take by mouth.     metoprolol succinate (TOPROL-XL) 50 MG 24 hr tablet Take 1 tablet (50 mg total) by mouth daily. (Patient taking differently: Take 25 mg by mouth daily. )     PREMARIN 0.45 mg tablet TAKE 1 TABLET (0.45 MG TOTAL) BY MOUTH DAILY.     rosuvastatin (CRESTOR) 5 MG tablet Take 1 tablet Monday Wednesday Friday     VIT A/VIT C/VIT E/ZINC/COPPER (ICAPS AREDS ORAL) Take by mouth.     [DISCONTINUED] estrogens, conjugated, (PREMARIN) 0.45 MG tablet Take 1 tablet (0.45 mg total) by mouth daily.     lansoprazole (PREVACID) 30 MG capsule TAKE 1 CAPSULE BY MOUTH TWICE A DAY     Current Facility-Administered Medications on File Prior to Visit   Medication     cyanocobalamin injection 1,000 mcg          Allergies:  Allergies   Allergen Reactions     Other Drug Allergy (See Comments)      Pt states she is allergic to all narcotics      Percocet [Oxycodone-Acetaminophen] Itching     Statins-Hmg-Coa Reductase Inhibitors Other (See Comments)     GI UPSET       Sulfa (Sulfonamide Antibiotics)      Vicodin [Hydrocodone-Acetaminophen] Itching     Zetia [Ezetimibe] Myalgia     Dyazide [Triamterene-Hydrochlorothiazid] Rash     Lisinopril Rash     Prilosec [Omeprazole] Rash       PSFHx:   Social History     Social History     Marital status:      Spouse name: N/A     Number of children: N/A     Years of education: N/A     Occupational History     Not on file.  "    Social History Main Topics     Smoking status: Never Smoker     Smokeless tobacco: Never Used     Alcohol use Yes      Comment: Occasional     Drug use: Not on file     Sexual activity: Not on file     Other Topics Concern     Not on file     Social History Narrative    SAMMY 1976    4 CHILDREN    RAKESH KANG    2 GRANDCHILDREN    [employer]HER NAME-JESSICA               Objective:   /76 (Patient Site: Right Arm, Patient Position: Sitting, Cuff Size: Adult Regular)  Pulse 82  Ht 5' 7.25\" (1.708 m)  Wt 172 lb 1.9 oz (78.1 kg)  LMP  (LMP Unknown)  SpO2 98%  Breastfeeding? No  BMI 26.76 kg/m2  Weight:   Wt Readings from Last 3 Encounters:   05/11/18 172 lb 1.9 oz (78.1 kg)   03/21/18 178 lb 12 oz (81.1 kg)   01/05/18 172 lb 1.9 oz (78.1 kg)   Pulse regular throughout.    General-appears well, no acute distress.  Skin: Normal. No rash or lesion  Lymph Nodes: None palpable-including neck, axilla, inguinal, epitrochlear.  Head:  Normocephalic.    Eyes: Midline.  Equal size., full ROM.  External exams normal.  No icterus  Ears:  Normal pinnae, canals, and TM's.    Nose:  Patent, without deformity.    Throat:  Moist mucous membranes without lesions, erythema, or exudate.    Neck: No palpable masses, lymphadenopathy or tenderness.No thyromegaly or goiter.  No thyroid nodule.  Carotid Arteries:  No Bruit.  Carotid upstroke normal  Axilla no adenopathy  Breast normal bilaterally  Chest Wall: No deformity or pain elicited on compression.  Respiratory:  Normal respiratory effort.  Lungs are clear with good breath sounds.  No dullness.  No wheezing.  Heart: Regular rhythm.  Normal sounding S1, S2 without S3, S4, murmurs, rubs, or gallops.    Abdomen:  The abdomen was flat, soft and nontender without guarding rebound or masses.  There are normal bowel sounds.  There is no hepatosplenomegaly.  There is no palpable enlargement of the aorta.  Pelvic  -External genitalia " normal  No palpable mass on bimanual   Rectum-normal  Rectovaginal confirmatory  Extremities:  Full ROM without limitation, deformity or edema.    4+ pulses  Neurologic-intact- No focal deficit.  Speech clear.  Coordination normal.  Strength symmetric  Orthopedic-no arthropathy.          Laboratory:   Lab Results   Component Value Date    WBC 5.8 03/21/2018    HGB 13.2 03/21/2018    HCT 39.7 03/21/2018     03/21/2018    CHOL 288 (H) 12/01/2017    TRIG 156 (H) 12/01/2017    HDL 60 12/01/2017     05/05/2017    K 4.2 05/05/2017     05/05/2017    CREATININE 0.67 05/05/2017    BUN 10 05/05/2017    CO2 25 05/05/2017    TSH 0.67 05/05/2017    HGBA1C 5.8 04/08/2016     Component Name  5/8/2018     213 (H) cholesterol   151 (H)   62   151 (H)              Patient Instructions - Robbie Granda MD - 05/08/2018 11:30 AM CDT  Try cutting the metoprolol to 25 mg a day (1/2 tablet a day).  If doing well after 2-3 months, and want to try coming off totally, give us a call.  If doing worse with the lower dose, you can go back up to the whole tablet a day (50 mg).         details…

## 2022-06-02 ENCOUNTER — NON-APPOINTMENT (OUTPATIENT)
Age: 62
End: 2022-06-02

## 2022-06-02 ENCOUNTER — APPOINTMENT (OUTPATIENT)
Dept: PULMONOLOGY | Facility: CLINIC | Age: 62
End: 2022-06-02
Payer: COMMERCIAL

## 2022-06-02 VITALS
WEIGHT: 242 LBS | SYSTOLIC BLOOD PRESSURE: 145 MMHG | DIASTOLIC BLOOD PRESSURE: 80 MMHG | TEMPERATURE: 97.8 F | OXYGEN SATURATION: 98 % | HEIGHT: 71 IN | BODY MASS INDEX: 33.88 KG/M2 | HEART RATE: 98 BPM | RESPIRATION RATE: 16 BRPM

## 2022-06-02 DIAGNOSIS — J12.82 COVID-19: ICD-10-CM

## 2022-06-02 DIAGNOSIS — U07.1 COVID-19: ICD-10-CM

## 2022-06-02 PROCEDURE — 94010 BREATHING CAPACITY TEST: CPT

## 2022-06-02 PROCEDURE — 99214 OFFICE O/P EST MOD 30 MIN: CPT | Mod: 25

## 2022-06-02 NOTE — PHYSICAL EXAM
[No Acute Distress] : no acute distress [Well Nourished] : well nourished [No Deformities] : no deformities [Well Developed] : well developed [III] : Mallampati Class: III [TextBox_2] : ow  [TextBox_68] : I:E ratio 1:3; clear

## 2022-06-02 NOTE — ADDENDUM
[FreeTextEntry1] : Documented by Tyrel Lovell acting as a scribe for Dr. Garrett Wood on 06/02/2022 .\par \par All medical record entries made by the Scribe were at my, Dr. Garrett Wood's, direction and personally dictated by me on. I have reviewed the chart and agree that the record accurately reflects my personal performance of the history, physical exam, assessment and plan. I have also personally directed, reviewed, and agree with the discharge instructions. \par \par \par

## 2022-06-02 NOTE — PROCEDURE
[FreeTextEntry1] : PFT - spi reveals normal flows; FEV1 is 3.26 which is 87% of predicted, normal flow volume loop \par \par FENO was declined because not covered by insurance      ; a normal value being less than 25\par Fractional exhaled nitric oxide (FENO) is regarded as a simple, noninvasive method for assessing eosinophilic airway inflammation. Produced by a variety of cells within the lung, nitric oxide (NO) concentrations are generally low in healthy individuals. However, high concentrations of NO appear to be involved in nonspecific host defense mechanisms and chronic inflammatory diseases such as asthma. The American Thoracic Society (ATS) therefore has recommended using FENO to aid in the diagnosis and monitoring of eosinophilic airway inflammation and asthma, and for identifying steroid responsive individuals whose chronic respiratory symptoms may be caused by airway inflammation.

## 2022-06-02 NOTE — ASSESSMENT
[FreeTextEntry1] : Mr. Keen is a 61 year old male with a history of asthma, allergy, GERD, OSAS, and extensive occupational exposure in the workplace (s/p 9/11), presenting to the office for a follow up visit s/p Covid19 Pneumonitis ([facility] admit) 11/2020- resolved- untreated OSAS \par \par His shortness of breath is multifactorial due to: \par -overweight/out of shape\par -poor breathing mechanics\par -asthma\par -?CAD\par \par problem 1: mild persistent asthma (stressed compliance 11/16/2020) \par -continue Symbicort 160 at 2 inhalations BID or Advair 250 1 inhalation BID\par - Continue Incruse 1 inhalation Qd\par -continue Singulair 10 mg before bed \par -Continue Ventolin as a rescue inhaler 2 inhalations pre-exercise\par -Asthma is  believed to be caused by inherited (genetic) and environmental factor, but its exact cause is unknown. Asthma may be triggered by allergens, lung infections, or irritants in the air. Asthma triggers are different for each person\par -Inhaler technique reviewed as well as oral hygiene techniques reviewed with patient. Avoidance of cold air, extremes of temperature, rescue inhaler should be used before exercise. Order of medication reviewed with patient. Recommended use of a cool mist humidifier in the bedroom. Instructed to gargle and spit after inhaler use. \par \par Problem 1A: COVID 19 infection 11/2020 resolved \par -s/p Covid-19 vaccine Pfizer x2 \par -Continue Dexamethasone 6mg to complete 10 days\par - s/p eliquis 5mg BID\par -recommended positional sleep (rotating every couple of hours) \par -continue Tessalon Perles 200 mg Q8H \par -recommended baby Aspirin qday\par COVID-19 precautionary Immune Support Recommendations:\par -OTC Vitamin C 500mg BID \par -OTC Quercetin 250-500mg BID \par -OTC Zinc 75-100mg per day \par -OTC Melatonin 1 or 2mg a night \par -OTC Vitamin D 1-4000mg per day \par -OTC Tonic Water 8oz per day\par -Water 0.5-1 gallon per day\par -continue Berberine 1000 BID\par \par Joint Support Supplements: \par -Liposomal Glutathione 500 mg BID\par -SPM 1 tablet BID \par -Turmeric \par \par problem 2: allergies/sinus\par -continue to use Astelin 0.15 1 sniff each nostril BID\par - Continue Clarinex 5 mg QHS\par -Environmental measures for allergies were encouraged including mattress and pillow cover, air purifier, and environmental controls. \par \par problem 3: GERD\par -continue Pepcid 40 mg QHS\par -recommended to increase hydration\par -Things to avoid including overeating, spicy foods, tight clothing, eating within three hours of bed, this list is not all inclusive. \par -For treatment of reflux, possible options discussed including diet control, H2 blockers, PPIs, as well as coating motility agents discussed as treatment options. Timing of meals and proximity of last meal to sleep were discussed. If symptoms persist, a formal gastrointestinal evaluation is needed.\par -Rule of 2's: Avoid eating too late, too fast, too much, too spicy or within two hours of bedtime \par \par problem 4: moderate sleep apnea \par -his sleep study showed an AH of 24.4\par - Recommended to use Oxy Aid or the chin strap\par -being recommended to see Bambi Farrell / Warren Mcdaniel for an oral appliance (NONCOMPLIANT)\par -Sleep apnea is associated with adverse clinical consequences which an affect most organ systems.  Cardiovascular disease risk includes arrhythmias, atrial fibrillation, hypertension, coronary artery disease, and stroke. Metabolic disorders include diabetes type 2, non-alcoholic fatty liver disease. Mood disorder especially depression; and cognitive decline especially in the elderly. Associations with  chronic reflux/Meyers’s esophagus some but not all inclusive. \par -Reasons  include arousal consistent with hypopnea; respiratory events most prominent in REM sleep or supine position; therefore sleep staging and body position are important for accurate diagnosis and estimation of AHI. \par \par problem 5: overweight/out of shape\par -Weight loss, exercise, and diet control were discussed and are highly encouraged. Treatment options were \par given such as, aqua therapy, and contacting a nutritionist.\par \par problem 6: poor breathing mechanics\par -Recommended Wim Hof and Buteyko breathing techniques \par -Proper breathing techniques were reviewed with an emphasis of exhalation. Patient instructed to breath in for 1 second and out for four seconds. Patient was encouraged to not talk while walking.\par \par problem 7: ?CAD\par -recommended to follow up with a cardiologist if needed \par \par Problem 8: Abnormal CT \par - Script given for follow up CT scan of the chest 6/2022 -Scripted by Joe \par - CAT scans are the only radiological modality to identify abnormalities w/in the lungs with regards to nodules/masses/lymph nodes. Risks, benefits were reviewed in detail. The guidelines for abnormalities include follow up CT scans at various intervals which could range from 6 weeks to 1 year intervals. If there is a change for the worse then consideration for a biopsy will be considered if you are a candidate. Second opinion evaluation with a thoracic surgeon or an interventional radiologist could be offered.\par \par Problem 9: Health maintenance \par -s/p flu shot ([place]) \par -recommended strep pneumonia vaccines: Prevnar-13 vaccine, followed by Pneumo vaccine 23 one year following\par -recommended early intervention for URIs\par -recommended regular osteoporosis evaluations\par -recommended early dermatological evaluations\par -recommended after the age of 50 to the age of 70, colonoscopy every 5 years\par \par F/U in 4 months with SPI and NiOx\par He is encouraged to call with any changes, concerns, or questions

## 2022-06-02 NOTE — HISTORY OF PRESENT ILLNESS
[FreeTextEntry1] : Mr. Keen is a 61 year old male to the office for a follow up visit for abnormal CT, acid reflux disease, asthma, SUZY.\par - he notes feeling fatigued \par -he notes not exercising \par -he notes retiring from coaching \par -he notes energy levels are sporadic depending on day \par -he denies swelling \par -he notes vision somewhat choppy \par -he notes sleeping 7-8 hrs/night mostly uninterrupted \par -he denies nocturia \par -he notes the humidity gets to him a bit \par -he notes not having the same energy levels post covid \par -he notes using the inhaler regularly \par -he notes getting pfizer x2 \par -he hasn't done anything for sleep apnea

## 2022-06-29 RX ORDER — MONTELUKAST 10 MG/1
10 TABLET, FILM COATED ORAL
Qty: 1 | Refills: 1 | Status: ACTIVE | COMMUNITY
Start: 2019-01-08 | End: 1900-01-01

## 2022-06-29 RX ORDER — DESLORATADINE 5 MG/1
5 TABLET ORAL DAILY
Qty: 30 | Refills: 1 | Status: ACTIVE | COMMUNITY
Start: 2018-03-09 | End: 1900-01-01

## 2022-06-29 RX ORDER — BENZONATATE 200 MG/1
200 CAPSULE ORAL 3 TIMES DAILY
Qty: 90 | Refills: 1 | Status: ACTIVE | COMMUNITY
Start: 2020-11-16 | End: 1900-01-01

## 2022-06-29 RX ORDER — AZELASTINE HYDROCHLORIDE 205.5 UG/1
0.15 SPRAY, METERED NASAL TWICE DAILY
Qty: 1 | Refills: 1 | Status: ACTIVE | COMMUNITY
Start: 2019-04-09 | End: 1900-01-01

## 2022-12-02 ENCOUNTER — APPOINTMENT (OUTPATIENT)
Dept: PULMONOLOGY | Facility: CLINIC | Age: 62
End: 2022-12-02

## 2022-12-02 VITALS
SYSTOLIC BLOOD PRESSURE: 120 MMHG | BODY MASS INDEX: 32.9 KG/M2 | HEIGHT: 71 IN | HEART RATE: 80 BPM | WEIGHT: 235 LBS | DIASTOLIC BLOOD PRESSURE: 80 MMHG | TEMPERATURE: 97.4 F | RESPIRATION RATE: 16 BRPM | OXYGEN SATURATION: 97 %

## 2022-12-02 DIAGNOSIS — U07.1 COVID-19: ICD-10-CM

## 2022-12-02 PROCEDURE — 94727 GAS DIL/WSHOT DETER LNG VOL: CPT

## 2022-12-02 PROCEDURE — 95012 NITRIC OXIDE EXP GAS DETER: CPT

## 2022-12-02 PROCEDURE — 94729 DIFFUSING CAPACITY: CPT

## 2022-12-02 PROCEDURE — 94010 BREATHING CAPACITY TEST: CPT

## 2022-12-02 PROCEDURE — ZZZZZ: CPT

## 2022-12-02 PROCEDURE — 99214 OFFICE O/P EST MOD 30 MIN: CPT | Mod: 25

## 2022-12-02 NOTE — ASSESSMENT
[FreeTextEntry1] : Mr. Keen is a 62 year old male with a history of asthma, allergy, GERD, OSAS, and extensive occupational exposure in the workplace (s/p 9/11), presenting to the office for a follow up visit s/p Covid19 Pneumonitis ([facility] admit) 11/2020- resolved- untreated OSAS (still) \par \par His shortness of breath is multifactorial due to: \par -overweight/out of shape\par -poor breathing mechanics\par -asthma\par -?CAD\par \par problem 1: mild persistent asthma (stressed compliance 11/16/2020) \par -continue Symbicort 160 at 2 inhalations BID or Advair 250 1 inhalation BID\par - Continue Incruse 1 inhalation Qd\par -continue Singulair 10 mg before bed \par -Continue Ventolin as a rescue inhaler 2 inhalations pre-exercise\par -Asthma is  believed to be caused by inherited (genetic) and environmental factor, but its exact cause is unknown. Asthma may be triggered by allergens, lung infections, or irritants in the air. Asthma triggers are different for each person\par -Inhaler technique reviewed as well as oral hygiene techniques reviewed with patient. Avoidance of cold air, extremes of temperature, rescue inhaler should be used before exercise. Order of medication reviewed with patient. Recommended use of a cool mist humidifier in the bedroom. Instructed to gargle and spit after inhaler use. \par \par Problem 1A: COVID 19 infection 11/2020 resolved \par -s/p Covid-19 vaccine Pfizer x2 \par -Continue Dexamethasone 6mg to complete 10 days\par - s/p eliquis 5mg BID\par -recommended positional sleep (rotating every couple of hours) \par -continue Tessalon Perles 200 mg Q8H \par -recommended baby Aspirin qday\par COVID-19 precautionary Immune Support Recommendations:\par -OTC Vitamin C 500mg BID \par -OTC Quercetin 250-500mg BID \par -OTC Zinc 75-100mg per day \par -OTC Melatonin 1 or 2mg a night \par -OTC Vitamin D 1-4000mg per day \par -OTC Tonic Water 8oz per day\par -Water 0.5-1 gallon per day\par -continue Berberine 1000 BID\par \par Joint Support Supplements: \par -Liposomal Glutathione 500 mg BID\par -SPM 1 tablet BID \par -Turmeric \par \par problem 2: allergies/sinus (quiet) \par -continue to use Astelin 0.15 1 sniff each nostril BID\par - Continue Clarinex 5 mg QHS\par -Environmental measures for allergies were encouraged including mattress and pillow cover, air purifier, and environmental controls. \par \par problem 3: GERD\par -continue Pepcid 40 mg QHS\par -recommended to increase hydration\par -Things to avoid including overeating, spicy foods, tight clothing, eating within three hours of bed, this list is not all inclusive. \par -For treatment of reflux, possible options discussed including diet control, H2 blockers, PPIs, as well as coating motility agents discussed as treatment options. Timing of meals and proximity of last meal to sleep were discussed. If symptoms persist, a formal gastrointestinal evaluation is needed.\par -Rule of 2's: Avoid eating too late, too fast, too much, too spicy or within two hours of bedtime \par \par problem 4: moderate sleep apnea (NC) \par -his sleep study showed an AH of 24.4\par - Recommended to use Oxy Aid or the chin strap\par -being recommended to see Bambi Farrell / Warren Mcdaniel for an oral appliance (NONCOMPLIANT)\par -Sleep apnea is associated with adverse clinical consequences which an affect most organ systems.  Cardiovascular disease risk includes arrhythmias, atrial fibrillation, hypertension, coronary artery disease, and stroke. Metabolic disorders include diabetes type 2, non-alcoholic fatty liver disease. Mood disorder especially depression; and cognitive decline especially in the elderly. Associations with  chronic reflux/Meyers’s esophagus some but not all inclusive. \par -Reasons  include arousal consistent with hypopnea; respiratory events most prominent in REM sleep or supine position; therefore sleep staging and body position are important for accurate diagnosis and estimation of AHI. \par \par problem 5: overweight/out of shape\par -Weight loss, exercise, and diet control were discussed and are highly encouraged. Treatment options were \par given such as, aqua therapy, and contacting a nutritionist.\par \par problem 6: poor breathing mechanics\par -Recommended Wim Hof and Buteyko breathing techniques \par -Proper breathing techniques were reviewed with an emphasis of exhalation. Patient instructed to breath in for 1 second and out for four seconds. Patient was encouraged to not talk while walking.\par \par problem 7: ?CAD\par -recommended to follow up with a cardiologist if needed \par \par Problem 8: Abnormal CT \par - Script given for follow up CT scan of the chest 6/2022 -Scripted by Silvino \par - CAT scans are the only radiological modality to identify abnormalities w/in the lungs with regards to nodules/masses/lymph nodes. Risks, benefits were reviewed in detail. The guidelines for abnormalities include follow up CT scans at various intervals which could range from 6 weeks to 1 year intervals. If there is a change for the worse then consideration for a biopsy will be considered if you are a candidate. Second opinion evaluation with a thoracic surgeon or an interventional radiologist could be offered.\par \par Problem 9: Health maintenance \par -s/p flu shot (NC) \par -recommended strep pneumonia vaccines: Prevnar-13 vaccine, followed by Pneumo vaccine 23 one year following\par -recommended early intervention for URIs\par -recommended regular osteoporosis evaluations\par -recommended early dermatological evaluations\par -recommended after the age of 50 to the age of 70, colonoscopy every 5 years\par \par F/U in 4 months with SPI and NiOx\par He is encouraged to call with any changes, concerns, or questions

## 2022-12-02 NOTE — PHYSICAL EXAM
[No Acute Distress] : no acute distress [Well Nourished] : well nourished [No Deformities] : no deformities [Well Developed] : well developed [III] : Mallampati Class: III [TextBox_2] : ow  [TextBox_68] : I:E 1:3, clear

## 2022-12-02 NOTE — ADDENDUM
[FreeTextEntry1] : Documented by Pankaj Arce acting as a scribe for Dr. Garrett Wood on (12/02/2022).\par \par All medical record entries made by the Scribe were at my, Dr. Garrett Wood's, direction and personally dictated by me on (12/02/2022). I have reviewed the chart and agree that the record accurately reflects my personal performance of the history, physical exam, assessment and plan. I have personally directed, reviewed, and agree with the discharge instructions. \par \par \par

## 2022-12-02 NOTE — PROCEDURE
[FreeTextEntry1] : FENO was 8; a normal value being less than 25\par Fractional exhaled nitric oxide (FENO) is regarded as a simple, noninvasive method for assessing eosinophilic airway inflammation. Produced by a variety of cells within the lung, nitric oxide (NO) concentrations are generally low in healthy individuals. However, high concentrations of NO appear to be involved in nonspecific host defense mechanisms and chronic inflammatory diseases such as asthma. The American Thoracic Society (ATS) therefore has recommended using FENO to aid in the diagnosis and monitoring of eosinophilic airway inflammation and asthma, and for identifying steroid responsive individuals whose chronic respiratory symptoms may be caused by airway inflammation. \par \par Full PFT- spi reveals moderate restriction; FEV1 was 2.39L which is 61% of predicted; hyperinflation; normal diffusion at 26.5, which is 95% of predicted; normal flow volume loop

## 2023-02-03 ENCOUNTER — APPOINTMENT (OUTPATIENT)
Dept: CT IMAGING | Facility: CLINIC | Age: 63
End: 2023-02-03
Payer: COMMERCIAL

## 2023-02-03 PROCEDURE — 71250 CT THORAX DX C-: CPT

## 2023-02-10 ENCOUNTER — NON-APPOINTMENT (OUTPATIENT)
Age: 63
End: 2023-02-10

## 2023-03-03 NOTE — H&P PST ADULT - TOBACCO USE
[phone].      Never smoker

## 2023-05-16 NOTE — BRIEF OPERATIVE NOTE - OPERATION/FINDINGS
[Time Spent: ___ minutes] : I have spent [unfilled] minutes of time on the encounter. superoposterior bursal sided tear near greater tuberosity insertion site

## 2023-06-02 ENCOUNTER — APPOINTMENT (OUTPATIENT)
Dept: PULMONOLOGY | Facility: CLINIC | Age: 63
End: 2023-06-02

## 2023-10-04 NOTE — PACU DISCHARGE NOTE - NS MD DISCHARGE NOTE DISCHARGE
Home Olumiant Counseling: I discussed with the patient the risks of Olumiant therapy including but not limited to upper respiratory tract infections, shingles, cold sores, and nausea. Live vaccines should be avoided.  This medication has been linked to serious infections; higher rate of mortality; malignancy and lymphoproliferative disorders; major adverse cardiovascular events; thrombosis; gastrointestinal perforations; neutropenia; lymphopenia; anemia; liver enzyme elevations; and lipid elevations.

## 2023-11-02 ENCOUNTER — APPOINTMENT (OUTPATIENT)
Dept: PULMONOLOGY | Facility: CLINIC | Age: 63
End: 2023-11-02
Payer: COMMERCIAL

## 2023-11-02 VITALS
HEIGHT: 71 IN | DIASTOLIC BLOOD PRESSURE: 84 MMHG | WEIGHT: 240 LBS | RESPIRATION RATE: 16 BRPM | OXYGEN SATURATION: 96 % | BODY MASS INDEX: 33.6 KG/M2 | SYSTOLIC BLOOD PRESSURE: 130 MMHG | TEMPERATURE: 97.8 F | HEART RATE: 74 BPM

## 2023-11-02 DIAGNOSIS — J45.909 UNSPECIFIED ASTHMA, UNCOMPLICATED: ICD-10-CM

## 2023-11-02 DIAGNOSIS — L23.7 ALLERGIC CONTACT DERMATITIS DUE TO PLANTS, EXCEPT FOOD: ICD-10-CM

## 2023-11-02 PROCEDURE — 99214 OFFICE O/P EST MOD 30 MIN: CPT | Mod: 25

## 2023-11-02 PROCEDURE — 94010 BREATHING CAPACITY TEST: CPT

## 2023-11-29 RX ORDER — AZITHROMYCIN 500 MG/1
500 TABLET, FILM COATED ORAL DAILY
Qty: 7 | Refills: 0 | Status: ACTIVE | COMMUNITY
Start: 2023-11-29 | End: 1900-01-01

## 2023-12-20 NOTE — H&P PST ADULT - LIVES WITH, PROFILE
Subjective: Nose is still running.  Having sporadic headaches.  No fever. No neck stiffness.    Falls since last visit:  none  Home/Social Environment:  lives with fiance in home with steps to second floor and steps to enter/ exit
spouse/children

## 2024-05-02 ENCOUNTER — APPOINTMENT (OUTPATIENT)
Dept: PULMONOLOGY | Facility: CLINIC | Age: 64
End: 2024-05-02
Payer: COMMERCIAL

## 2024-05-02 VITALS
TEMPERATURE: 97.2 F | RESPIRATION RATE: 18 BRPM | SYSTOLIC BLOOD PRESSURE: 140 MMHG | BODY MASS INDEX: 32.9 KG/M2 | WEIGHT: 235 LBS | OXYGEN SATURATION: 98 % | HEART RATE: 74 BPM | HEIGHT: 71 IN | DIASTOLIC BLOOD PRESSURE: 90 MMHG

## 2024-05-02 DIAGNOSIS — R91.8 OTHER NONSPECIFIC ABNORMAL FINDING OF LUNG FIELD: ICD-10-CM

## 2024-05-02 DIAGNOSIS — J45.909 UNSPECIFIED ASTHMA, UNCOMPLICATED: ICD-10-CM

## 2024-05-02 DIAGNOSIS — J30.9 ALLERGIC RHINITIS, UNSPECIFIED: ICD-10-CM

## 2024-05-02 DIAGNOSIS — R06.02 SHORTNESS OF BREATH: ICD-10-CM

## 2024-05-02 DIAGNOSIS — K21.9 GASTRO-ESOPHAGEAL REFLUX DISEASE W/OUT ESOPHAGITIS: ICD-10-CM

## 2024-05-02 PROCEDURE — 99214 OFFICE O/P EST MOD 30 MIN: CPT

## 2024-05-02 PROCEDURE — G2211 COMPLEX E/M VISIT ADD ON: CPT

## 2024-05-02 RX ORDER — LEVALBUTEROL HYDROCHLORIDE 0.63 MG/3ML
0.63 SOLUTION RESPIRATORY (INHALATION)
Qty: 120 | Refills: 2 | Status: ACTIVE | COMMUNITY
Start: 2024-05-02 | End: 1900-01-01

## 2024-05-02 RX ORDER — PREDNISONE 10 MG/1
10 TABLET ORAL
Qty: 50 | Refills: 0 | Status: ACTIVE | COMMUNITY
Start: 2023-11-02 | End: 1900-01-01

## 2024-05-02 NOTE — ASSESSMENT
[FreeTextEntry1] : Mr. Keen is a 63 year old male with a history of asthma, allergy, GERD, OSAS, and extensive occupational exposure in the workplace (s/p 9/11),  s/p Covid19 Pneumonitis ([facility] admit) 11/2020- resolved- untreated OSAS (still)- active asthma s/p URI (11/2023, now)  His shortness of breath is multifactorial due to: -overweight/out of shape -poor breathing mechanics -asthma -?CAD  problem 1: mild persistent asthma (stressed compliance 11/16/2020) (active)- 11/2023, 5/2024 -add Prednisone 20 mg x 7 days, 10 mg x 7 days -continue Symbicort 160 at 2 inhalations BID or Advair 250 1 inhalation BID  - Continue Incruse 1 inhalation Qd -continue Singulair 10 mg before bed -Continue Ventolin as a rescue inhaler 2 inhalations pre-exercise or Albuterol (.83) via nebulizer, up to Q6H  -Asthma is believed to be caused by inherited (genetic) and environmental factor, but its exact cause is unknown. Asthma may be triggered by allergens, lung infections, or irritants in the air. Asthma triggers are different for each person -Inhaler technique reviewed as well as oral hygiene techniques reviewed with patient. Avoidance of cold air, extremes of temperature, rescue inhaler should be used before exercise. Order of medication reviewed with patient. Recommended use of a cool mist humidifier in the bedroom. Instructed to gargle and spit after inhaler use.  Problem 1A: COVID 19 infection 11/2020 resolved -s/p Covid-19 vaccine Pfizer x2 -Continue Dexamethasone 6mg to complete 10 days - s/p eliquis 5mg BID -recommended positional sleep (rotating every couple of hours) -continue Tessalon Perles 200 mg Q8H -recommended baby Aspirin qday  COVID-19 precautionary Immune Support Recommendations: -OTC Vitamin C 500mg BID -OTC Quercetin 250-500mg BID -OTC Zinc 75-100mg per day -OTC Melatonin 1 or 2mg a night -OTC Vitamin D 1-4000mg per day -OTC Tonic Water 8oz per day -Water 0.5-1 gallon per day -continue Berberine 1000 BID  Joint Support Supplements: -Liposomal Glutathione 500 mg BID -SPM 1 tablet BID -Turmeric  problem 2: allergies/sinus (quiet) -continue to use Astelin 0.15 1 sniff each nostril BID - Continue Clarinex 5 mg QHS  -Environmental measures for allergies were encouraged including mattress and pillow cover, air purifier, and environmental controls.  problem 3: GERD -continue Pepcid 40 mg QHS -recommended to increase hydration -Things to avoid including overeating, spicy foods, tight clothing, eating within three hours of bed, this list is not all inclusive. -For treatment of reflux, possible options discussed including diet control, H2 blockers, PPIs, as well as coating motility agents discussed as treatment options. Timing of meals and proximity of last meal to sleep were discussed. If symptoms persist, a formal gastrointestinal evaluation is needed.  -Rule of 2's: Avoid eating too late, too fast, too much, too spicy or within two hours of bedtime    problem 4: moderate sleep apnea (NC) -his sleep study showed an AH of 24.4 - Recommended to use Oxy Aid or the chin strap -being recommended to see Bambi Farrell / Warren Mcdaniel for an oral appliance (NONCOMPLIANT) -Sleep apnea is associated with adverse clinical consequences which an affect most organ systems. Cardiovascular disease risk includes arrhythmias, atrial fibrillation, hypertension, coronary artery disease, and stroke. Metabolic disorders include diabetes type 2, non-alcoholic fatty liver disease. Mood disorder especially depression; and cognitive decline especially in the elderly. Associations with chronic reflux/Meyers's esophagus some but not all inclusive. -Reasons include arousal consistent with hypopnea; respiratory events most prominent in REM sleep or supine position; therefore sleep staging and body position are important for accurate diagnosis and estimation of AHI.  problem 5: overweight/out of shape -recommended Berberine OTC  -Weight loss, exercise, and diet control were discussed and are highly encouraged. Treatment options were given such as, aqua therapy, and contacting a nutritionist.  problem 6: poor breathing mechanics -Recommended Jose Francisco Hof and Buteyko breathing techniques -Proper breathing techniques were reviewed with an emphasis of exhalation. Patient instructed to breath in for 1 second and out for four seconds. Patient was encouraged to not talk while walking.   problem 7: ?CAD -recommended to follow up with a cardiologist if needed  Problem 8: Abnormal CT - Script given for follow up CT scan of the chest 2/2024; coronary CT  - CAT scans are the only radiological modality to identify abnormalities w/in the lungs with regards to nodules/masses/lymph nodes. Risks, benefits were reviewed in detail. The guidelines for abnormalities include follow up CT scans at various intervals which could range from 6 weeks to 1 year intervals. If there is a change for the worse then consideration for a biopsy will be considered if you are a candidate. Second opinion evaluation with a thoracic surgeon or an interventional radiologist could be offered.  Problem 9: Health maintenance -s/p flu shot (NC) -recommended strep pneumonia vaccines: Prevnar-13 vaccine, followed by Pneumo vaccine 23 one year following -recommended early intervention for URIs -recommended regular osteoporosis evaluations -recommended early dermatological evaluations -recommended after the age of 50 to the age of 70, colonoscopy every 5 years   F/U in 4 months with SPI and NiOx  He is encouraged to call with any changes, concerns, or questions.

## 2024-05-02 NOTE — ADDENDUM
[FreeTextEntry1] : Documented by Biju Park acting as a scribe for Dr. Garrett Wood on 05/02/2024. All medical record entries made by the Scribe were at my, Dr. Garrett Wood's, direction and personally dictated by me on 05/02/2024. I have reviewed the chart and agree that the record accurately reflects my personal performance of the history, physical exam, assessment and plan. I have also personally directed, reviewed, and agree with the discharge instructions.

## 2024-05-02 NOTE — HISTORY OF PRESENT ILLNESS
[FreeTextEntry1] : Mr. Keen is a 63 year old male to the office for a follow up visit for abnormal CT, acid reflux disease, asthma, SUZY.  -he notes chest congestion  -he notes cough  -he notes cough is productive with green sputum  -he denies dysphonia -he notes sinuses are congested  -he notes having cold like symptoms with lingering cough and congestion  -he notes taking GERD Sx  -he notes taking his breathing medication as prescribed  -he notes high cholesterol  -he notes not using his nebulizer   -he denies any headaches, nausea, emesis, fever, chills, sweats, chest pain, chest pressure, coughing, wheezing, palpitations, diarrhea, constipation, dysphagia, vertigo, arthralgias, myalgias, leg swelling, itchy eyes, itchy ears, heartburn, reflux, or sour taste in the mouth.

## 2024-05-13 ENCOUNTER — APPOINTMENT (OUTPATIENT)
Dept: CT IMAGING | Facility: CLINIC | Age: 64
End: 2024-05-13
Payer: COMMERCIAL

## 2024-05-13 PROCEDURE — 75574 CT ANGIO HRT W/3D IMAGE: CPT

## 2024-05-21 ENCOUNTER — NON-APPOINTMENT (OUTPATIENT)
Age: 64
End: 2024-05-21

## 2024-05-21 ENCOUNTER — APPOINTMENT (OUTPATIENT)
Dept: CARDIOLOGY | Facility: CLINIC | Age: 64
End: 2024-05-21
Payer: COMMERCIAL

## 2024-05-21 VITALS
BODY MASS INDEX: 34.16 KG/M2 | SYSTOLIC BLOOD PRESSURE: 148 MMHG | DIASTOLIC BLOOD PRESSURE: 90 MMHG | WEIGHT: 244 LBS | HEART RATE: 84 BPM | HEIGHT: 71 IN | OXYGEN SATURATION: 96 %

## 2024-05-21 DIAGNOSIS — G47.33 OBSTRUCTIVE SLEEP APNEA (ADULT) (PEDIATRIC): ICD-10-CM

## 2024-05-21 DIAGNOSIS — E66.9 OBESITY, UNSPECIFIED: ICD-10-CM

## 2024-05-21 PROCEDURE — 93000 ELECTROCARDIOGRAM COMPLETE: CPT

## 2024-05-21 PROCEDURE — 99205 OFFICE O/P NEW HI 60 MIN: CPT | Mod: 25

## 2024-05-21 PROCEDURE — G2211 COMPLEX E/M VISIT ADD ON: CPT | Mod: NC

## 2024-05-21 PROCEDURE — 99402 PREV MED CNSL INDIV APPRX 30: CPT

## 2024-05-21 RX ORDER — ATORVASTATIN CALCIUM 40 MG/1
40 TABLET, FILM COATED ORAL
Qty: 1 | Refills: 1 | Status: ACTIVE | COMMUNITY
Start: 2024-05-21 | End: 1900-01-01

## 2024-05-21 NOTE — HISTORY OF PRESENT ILLNESS
[FreeTextEntry1] : Mr. Keen is a 58 year old male presenting to the office for a follow up visit for abnormal CT, acid reflux disease, asthma, SUZY. He is here for pulmonary pre-op clearance for shoulder surgery. His chief complaint is torn tendon in his shoulder. \par - He is scheduled to have surgery to repair full tendon tears in his shoulder by Dr. Vinicius Sosa\par - He states that he has been exhausted\par - He states that he occasionally (once every other day) gets palpitations. \par - His weight is stable. \par - He is eating well\par - his bowels are regular\par - He states that his sinuses have been drippy. He has been blowing his nose a lot. \par - He feels that his sinuses are otherwise fine\par - He is getting reflux 2-3 times per week, but he has not been taking Ranitidine. \par - He has noticed that when he is supine in his bed, he feels that he has to catch his breath. \par - He denies any headaches, nausea, vomiting, fever, chills, sweats, chest pain, chest pressure, coughing, wheezing, fatigue, diarrhea, constipation, dysphagia, dizziness, leg swelling, leg pain, itchy eyes, itchy ears, or sour taste in the mouth.  yes

## 2024-10-01 ENCOUNTER — APPOINTMENT (OUTPATIENT)
Dept: CARDIOLOGY | Facility: CLINIC | Age: 64
End: 2024-10-01
Payer: COMMERCIAL

## 2024-10-01 DIAGNOSIS — E78.5 HYPERLIPIDEMIA, UNSPECIFIED: ICD-10-CM

## 2024-10-01 DIAGNOSIS — Z13.1 ENCOUNTER FOR SCREENING FOR DIABETES MELLITUS: ICD-10-CM

## 2024-10-01 PROCEDURE — 99214 OFFICE O/P EST MOD 30 MIN: CPT

## 2024-10-01 PROCEDURE — 93306 TTE W/DOPPLER COMPLETE: CPT

## 2024-10-01 PROCEDURE — G2211 COMPLEX E/M VISIT ADD ON: CPT | Mod: NC

## 2024-10-02 LAB
ALBUMIN SERPL ELPH-MCNC: 4.7 G/DL
ALP BLD-CCNC: 94 U/L
ALT SERPL-CCNC: 42 U/L
ANION GAP SERPL CALC-SCNC: 13 MMOL/L
AST SERPL-CCNC: 19 U/L
BILIRUB SERPL-MCNC: 0.7 MG/DL
BUN SERPL-MCNC: 14 MG/DL
CALCIUM SERPL-MCNC: 9.7 MG/DL
CHLORIDE SERPL-SCNC: 103 MMOL/L
CHOLEST SERPL-MCNC: 195 MG/DL
CO2 SERPL-SCNC: 25 MMOL/L
CREAT SERPL-MCNC: 1.16 MG/DL
EGFR: 71 ML/MIN/1.73M2
ESTIMATED AVERAGE GLUCOSE: 154 MG/DL
GLUCOSE SERPL-MCNC: 169 MG/DL
HBA1C MFR BLD HPLC: 7 %
HCT VFR BLD CALC: 47.5 %
HDLC SERPL-MCNC: 43 MG/DL
HGB BLD-MCNC: 16 G/DL
LDLC SERPL CALC-MCNC: 112 MG/DL
MCHC RBC-ENTMCNC: 30.7 PG
MCHC RBC-ENTMCNC: 33.7 GM/DL
MCV RBC AUTO: 91.2 FL
NONHDLC SERPL-MCNC: 153 MG/DL
PLATELET # BLD AUTO: 194 K/UL
POTASSIUM SERPL-SCNC: 4.7 MMOL/L
PROT SERPL-MCNC: 7.4 G/DL
RBC # BLD: 5.21 M/UL
RBC # FLD: 12.5 %
SODIUM SERPL-SCNC: 141 MMOL/L
TRIGL SERPL-MCNC: 234 MG/DL
WBC # FLD AUTO: 6.75 K/UL

## 2024-10-02 RX ORDER — EZETIMIBE 10 MG/1
10 TABLET ORAL
Qty: 90 | Refills: 3 | Status: ACTIVE | COMMUNITY
Start: 2024-10-02 | End: 1900-01-01

## 2025-01-15 ENCOUNTER — APPOINTMENT (OUTPATIENT)
Dept: ORTHOPEDIC SURGERY | Facility: CLINIC | Age: 65
End: 2025-01-15
Payer: COMMERCIAL

## 2025-01-15 VITALS — BODY MASS INDEX: 32.91 KG/M2 | WEIGHT: 243 LBS | HEIGHT: 72 IN

## 2025-01-15 VITALS — HEART RATE: 77 BPM | SYSTOLIC BLOOD PRESSURE: 134 MMHG | DIASTOLIC BLOOD PRESSURE: 85 MMHG

## 2025-01-15 DIAGNOSIS — M17.11 UNILATERAL PRIMARY OSTEOARTHRITIS, RIGHT KNEE: ICD-10-CM

## 2025-01-15 PROCEDURE — 73564 X-RAY EXAM KNEE 4 OR MORE: CPT | Mod: 26,RT

## 2025-01-15 PROCEDURE — 99203 OFFICE O/P NEW LOW 30 MIN: CPT

## 2025-01-15 RX ORDER — PANTOPRAZOLE 40 MG/1
TABLET, DELAYED RELEASE ORAL
Refills: 0 | Status: ACTIVE | COMMUNITY

## 2025-01-15 RX ORDER — ATORVASTATIN CALCIUM 20 MG/1
20 TABLET, FILM COATED ORAL
Refills: 0 | Status: ACTIVE | COMMUNITY

## 2025-01-15 RX ORDER — TETRACYCLINE HYDROCHLORIDE 500 MG/1
TABLET, FILM COATED ORAL
Refills: 0 | Status: ACTIVE | COMMUNITY

## 2025-01-15 RX ORDER — BUDESONIDE AND FORMOTEROL FUMARATE DIHYDRATE 160; 4.5 UG/1; UG/1
160-4.5 AEROSOL RESPIRATORY (INHALATION)
Refills: 0 | Status: ACTIVE | COMMUNITY

## 2025-01-15 RX ORDER — MELOXICAM 15 MG/1
15 TABLET ORAL DAILY
Qty: 30 | Refills: 2 | Status: ACTIVE | COMMUNITY
Start: 2025-01-15 | End: 1900-01-01

## 2025-01-15 RX ORDER — ALBUTEROL 90 MCG
AEROSOL (GRAM) INHALATION
Refills: 0 | Status: ACTIVE | COMMUNITY

## 2025-01-31 ENCOUNTER — APPOINTMENT (OUTPATIENT)
Dept: PULMONOLOGY | Facility: CLINIC | Age: 65
End: 2025-01-31
Payer: COMMERCIAL

## 2025-01-31 VITALS
OXYGEN SATURATION: 97 % | WEIGHT: 247 LBS | HEIGHT: 73 IN | RESPIRATION RATE: 18 BRPM | BODY MASS INDEX: 32.74 KG/M2 | HEART RATE: 89 BPM | TEMPERATURE: 97.16 F | SYSTOLIC BLOOD PRESSURE: 140 MMHG | DIASTOLIC BLOOD PRESSURE: 88 MMHG

## 2025-01-31 DIAGNOSIS — J45.909 UNSPECIFIED ASTHMA, UNCOMPLICATED: ICD-10-CM

## 2025-01-31 DIAGNOSIS — E66.9 OBESITY, UNSPECIFIED: ICD-10-CM

## 2025-01-31 DIAGNOSIS — R91.8 OTHER NONSPECIFIC ABNORMAL FINDING OF LUNG FIELD: ICD-10-CM

## 2025-01-31 DIAGNOSIS — G47.33 OBSTRUCTIVE SLEEP APNEA (ADULT) (PEDIATRIC): ICD-10-CM

## 2025-01-31 DIAGNOSIS — R06.02 SHORTNESS OF BREATH: ICD-10-CM

## 2025-01-31 DIAGNOSIS — K21.9 GASTRO-ESOPHAGEAL REFLUX DISEASE W/OUT ESOPHAGITIS: ICD-10-CM

## 2025-01-31 DIAGNOSIS — J30.9 ALLERGIC RHINITIS, UNSPECIFIED: ICD-10-CM

## 2025-01-31 PROCEDURE — 94729 DIFFUSING CAPACITY: CPT

## 2025-01-31 PROCEDURE — 94727 GAS DIL/WSHOT DETER LNG VOL: CPT

## 2025-01-31 PROCEDURE — ZZZZZ: CPT

## 2025-01-31 PROCEDURE — 94010 BREATHING CAPACITY TEST: CPT

## 2025-01-31 PROCEDURE — 99214 OFFICE O/P EST MOD 30 MIN: CPT | Mod: 25

## 2025-02-04 ENCOUNTER — APPOINTMENT (OUTPATIENT)
Dept: ORTHOPEDIC SURGERY | Facility: CLINIC | Age: 65
End: 2025-02-04

## 2025-02-05 ENCOUNTER — APPOINTMENT (OUTPATIENT)
Dept: ORTHOPEDIC SURGERY | Facility: CLINIC | Age: 65
End: 2025-02-05

## 2025-08-28 ENCOUNTER — NON-APPOINTMENT (OUTPATIENT)
Age: 65
End: 2025-08-28

## 2025-08-28 ENCOUNTER — APPOINTMENT (OUTPATIENT)
Dept: PULMONOLOGY | Facility: CLINIC | Age: 65
End: 2025-08-28

## 2025-08-28 VITALS
WEIGHT: 197 LBS | SYSTOLIC BLOOD PRESSURE: 124 MMHG | DIASTOLIC BLOOD PRESSURE: 84 MMHG | HEART RATE: 70 BPM | TEMPERATURE: 97.1 F | OXYGEN SATURATION: 98 % | BODY MASS INDEX: 25.99 KG/M2 | RESPIRATION RATE: 16 BRPM

## 2025-08-28 DIAGNOSIS — J45.909 UNSPECIFIED ASTHMA, UNCOMPLICATED: ICD-10-CM

## 2025-08-28 DIAGNOSIS — K21.9 GASTRO-ESOPHAGEAL REFLUX DISEASE W/OUT ESOPHAGITIS: ICD-10-CM

## 2025-08-28 DIAGNOSIS — R91.8 OTHER NONSPECIFIC ABNORMAL FINDING OF LUNG FIELD: ICD-10-CM

## 2025-08-28 DIAGNOSIS — J30.9 ALLERGIC RHINITIS, UNSPECIFIED: ICD-10-CM

## 2025-08-28 DIAGNOSIS — R06.02 SHORTNESS OF BREATH: ICD-10-CM

## 2025-08-28 DIAGNOSIS — G47.33 OBSTRUCTIVE SLEEP APNEA (ADULT) (PEDIATRIC): ICD-10-CM

## 2025-08-28 PROCEDURE — 99214 OFFICE O/P EST MOD 30 MIN: CPT | Mod: 25

## 2025-08-28 PROCEDURE — 94010 BREATHING CAPACITY TEST: CPT
